# Patient Record
Sex: MALE | Race: WHITE | Employment: OTHER | URBAN - METROPOLITAN AREA
[De-identification: names, ages, dates, MRNs, and addresses within clinical notes are randomized per-mention and may not be internally consistent; named-entity substitution may affect disease eponyms.]

---

## 2021-12-12 ENCOUNTER — HOSPITAL ENCOUNTER (INPATIENT)
Age: 86
LOS: 2 days | Discharge: ANOTHER HOSPITAL | DRG: 280 | End: 2021-12-14
Attending: EMERGENCY MEDICINE | Admitting: FAMILY MEDICINE
Payer: MEDICARE

## 2021-12-12 ENCOUNTER — APPOINTMENT (OUTPATIENT)
Dept: GENERAL RADIOLOGY | Age: 86
DRG: 280 | End: 2021-12-12
Attending: EMERGENCY MEDICINE
Payer: MEDICARE

## 2021-12-12 DIAGNOSIS — I21.4 NSTEMI (NON-ST ELEVATED MYOCARDIAL INFARCTION) (HCC): Primary | ICD-10-CM

## 2021-12-12 DIAGNOSIS — I50.9 CONGESTIVE HEART FAILURE, UNSPECIFIED HF CHRONICITY, UNSPECIFIED HEART FAILURE TYPE (HCC): ICD-10-CM

## 2021-12-12 LAB
ALBUMIN SERPL-MCNC: 3.3 G/DL (ref 3.5–5)
ALBUMIN/GLOB SERPL: 1 {RATIO} (ref 1.1–2.2)
ALP SERPL-CCNC: 272 U/L (ref 45–117)
ALT SERPL-CCNC: 69 U/L (ref 12–78)
ANION GAP SERPL CALC-SCNC: 6 MMOL/L (ref 5–15)
APTT PPP: 36.9 SEC (ref 21.2–34.1)
AST SERPL W P-5'-P-CCNC: 128 U/L (ref 15–37)
BASOPHILS # BLD: 0.1 K/UL (ref 0–0.1)
BASOPHILS NFR BLD: 1 % (ref 0–1)
BILIRUB SERPL-MCNC: 0.8 MG/DL (ref 0.2–1)
BNP SERPL-MCNC: ABNORMAL PG/ML
BNP SERPL-MCNC: ABNORMAL PG/ML
BUN SERPL-MCNC: 21 MG/DL (ref 6–20)
BUN/CREAT SERPL: 19 (ref 12–20)
CA-I BLD-MCNC: 8.9 MG/DL (ref 8.5–10.1)
CHLORIDE SERPL-SCNC: 105 MMOL/L (ref 97–108)
CO2 SERPL-SCNC: 28 MMOL/L (ref 21–32)
CREAT SERPL-MCNC: 1.09 MG/DL (ref 0.7–1.3)
DATE LAST DOSE: 0
DIFFERENTIAL METHOD BLD: ABNORMAL
EOSINOPHIL # BLD: 0.1 K/UL (ref 0–0.4)
EOSINOPHIL NFR BLD: 1 % (ref 0–7)
ERYTHROCYTE [DISTWIDTH] IN BLOOD BY AUTOMATED COUNT: 14.5 % (ref 11.5–14.5)
GLOBULIN SER CALC-MCNC: 3.2 G/DL (ref 2–4)
GLUCOSE SERPL-MCNC: 119 MG/DL (ref 65–100)
HCT VFR BLD AUTO: 45.8 % (ref 36.6–50.3)
HGB BLD-MCNC: 15 G/DL (ref 12.1–17)
IMM GRANULOCYTES # BLD AUTO: 0 K/UL (ref 0–0.04)
IMM GRANULOCYTES NFR BLD AUTO: 0 % (ref 0–0.5)
LYMPHOCYTES # BLD: 0.9 K/UL (ref 0.8–3.5)
LYMPHOCYTES NFR BLD: 10 % (ref 12–49)
MCH RBC QN AUTO: 32.5 PG (ref 26–34)
MCHC RBC AUTO-ENTMCNC: 32.8 G/DL (ref 30–36.5)
MCV RBC AUTO: 99.1 FL (ref 80–99)
MONOCYTES # BLD: 1 K/UL (ref 0–1)
MONOCYTES NFR BLD: 11 % (ref 5–13)
NEUTS SEG # BLD: 7.3 K/UL (ref 1.8–8)
NEUTS SEG NFR BLD: 77 % (ref 32–75)
NRBC # BLD: 0 K/UL (ref 0–0.01)
NRBC BLD-RTO: 0 PER 100 WBC
PHENYTOIN SERPL-MCNC: 13.1 UG/ML (ref 10–20)
PLATELET # BLD AUTO: 224 K/UL (ref 150–400)
PMV BLD AUTO: 9.8 FL (ref 8.9–12.9)
POTASSIUM SERPL-SCNC: 4.2 MMOL/L (ref 3.5–5.1)
PROT SERPL-MCNC: 6.5 G/DL (ref 6.4–8.2)
RBC # BLD AUTO: 4.62 M/UL (ref 4.1–5.7)
REPORTED DOSE,DOSE: 0 UNITS
SODIUM SERPL-SCNC: 139 MMOL/L (ref 136–145)
THERAPEUTIC RANGE,PTTT: ABNORMAL SEC (ref 82–109)
TROPONIN-HIGH SENSITIVITY: 8731 NG/L (ref 0–76)
TROPONIN-HIGH SENSITIVITY: 8908 NG/L (ref 0–76)
WBC # BLD AUTO: 9.4 K/UL (ref 4.1–11.1)

## 2021-12-12 PROCEDURE — 83880 ASSAY OF NATRIURETIC PEPTIDE: CPT

## 2021-12-12 PROCEDURE — 74011250636 HC RX REV CODE- 250/636: Performed by: FAMILY MEDICINE

## 2021-12-12 PROCEDURE — 84484 ASSAY OF TROPONIN QUANT: CPT

## 2021-12-12 PROCEDURE — 36415 COLL VENOUS BLD VENIPUNCTURE: CPT

## 2021-12-12 PROCEDURE — 65270000029 HC RM PRIVATE

## 2021-12-12 PROCEDURE — 80184 ASSAY OF PHENOBARBITAL: CPT

## 2021-12-12 PROCEDURE — 80185 ASSAY OF PHENYTOIN TOTAL: CPT

## 2021-12-12 PROCEDURE — 85730 THROMBOPLASTIN TIME PARTIAL: CPT

## 2021-12-12 PROCEDURE — 71045 X-RAY EXAM CHEST 1 VIEW: CPT

## 2021-12-12 PROCEDURE — 85025 COMPLETE CBC W/AUTO DIFF WBC: CPT

## 2021-12-12 PROCEDURE — 93005 ELECTROCARDIOGRAM TRACING: CPT

## 2021-12-12 PROCEDURE — 74011250637 HC RX REV CODE- 250/637: Performed by: STUDENT IN AN ORGANIZED HEALTH CARE EDUCATION/TRAINING PROGRAM

## 2021-12-12 PROCEDURE — 80053 COMPREHEN METABOLIC PANEL: CPT

## 2021-12-12 PROCEDURE — 87040 BLOOD CULTURE FOR BACTERIA: CPT

## 2021-12-12 PROCEDURE — 74011250637 HC RX REV CODE- 250/637: Performed by: FAMILY MEDICINE

## 2021-12-12 PROCEDURE — 99285 EMERGENCY DEPT VISIT HI MDM: CPT

## 2021-12-12 RX ORDER — FUROSEMIDE 10 MG/ML
40 INJECTION INTRAMUSCULAR; INTRAVENOUS 2 TIMES DAILY
Status: DISCONTINUED | OUTPATIENT
Start: 2021-12-12 | End: 2021-12-14 | Stop reason: HOSPADM

## 2021-12-12 RX ORDER — ASPIRIN 325 MG
325 TABLET ORAL
Status: COMPLETED | OUTPATIENT
Start: 2021-12-12 | End: 2021-12-12

## 2021-12-12 RX ORDER — PHENOBARBITAL 32.4 MG/1
97.2 TABLET ORAL ONCE
Status: COMPLETED | OUTPATIENT
Start: 2021-12-12 | End: 2021-12-12

## 2021-12-12 RX ORDER — HEPARIN SODIUM 1000 [USP'U]/ML
4000 INJECTION, SOLUTION INTRAVENOUS; SUBCUTANEOUS ONCE
Status: COMPLETED | OUTPATIENT
Start: 2021-12-12 | End: 2021-12-12

## 2021-12-12 RX ORDER — ASPIRIN 325 MG
325 TABLET ORAL DAILY
Status: DISCONTINUED | OUTPATIENT
Start: 2021-12-13 | End: 2021-12-14 | Stop reason: HOSPADM

## 2021-12-12 RX ORDER — HEPARIN SODIUM 1000 [USP'U]/ML
4000 INJECTION, SOLUTION INTRAVENOUS; SUBCUTANEOUS AS NEEDED
Status: DISCONTINUED | OUTPATIENT
Start: 2021-12-12 | End: 2021-12-14 | Stop reason: HOSPADM

## 2021-12-12 RX ORDER — ACETAMINOPHEN 325 MG/1
650 TABLET ORAL
Status: DISCONTINUED | OUTPATIENT
Start: 2021-12-12 | End: 2021-12-14 | Stop reason: HOSPADM

## 2021-12-12 RX ORDER — ALLOPURINOL 300 MG/1
300 TABLET ORAL
Status: DISCONTINUED | OUTPATIENT
Start: 2021-12-12 | End: 2021-12-14 | Stop reason: HOSPADM

## 2021-12-12 RX ORDER — PHENYTOIN SODIUM 100 MG/1
100 CAPSULE, EXTENDED RELEASE ORAL
Status: COMPLETED | OUTPATIENT
Start: 2021-12-12 | End: 2021-12-12

## 2021-12-12 RX ORDER — PHENOBARBITAL 32.4 MG/1
97.2 TABLET ORAL 2 TIMES DAILY
Status: DISCONTINUED | OUTPATIENT
Start: 2021-12-12 | End: 2021-12-14 | Stop reason: HOSPADM

## 2021-12-12 RX ORDER — POLYETHYLENE GLYCOL 3350 17 G/17G
17 POWDER, FOR SOLUTION ORAL DAILY PRN
Status: DISCONTINUED | OUTPATIENT
Start: 2021-12-12 | End: 2021-12-14 | Stop reason: HOSPADM

## 2021-12-12 RX ORDER — CLOPIDOGREL 300 MG/1
600 TABLET, FILM COATED ORAL DAILY
Status: DISCONTINUED | OUTPATIENT
Start: 2021-12-12 | End: 2021-12-12

## 2021-12-12 RX ORDER — PHENYTOIN SODIUM 100 MG/1
100 CAPSULE, EXTENDED RELEASE ORAL 2 TIMES DAILY
Status: DISCONTINUED | OUTPATIENT
Start: 2021-12-12 | End: 2021-12-14 | Stop reason: HOSPADM

## 2021-12-12 RX ORDER — CLOPIDOGREL 300 MG/1
300 TABLET, FILM COATED ORAL DAILY
Status: DISCONTINUED | OUTPATIENT
Start: 2021-12-13 | End: 2021-12-12

## 2021-12-12 RX ORDER — HEPARIN SODIUM 1000 [USP'U]/ML
2000 INJECTION, SOLUTION INTRAVENOUS; SUBCUTANEOUS AS NEEDED
Status: DISCONTINUED | OUTPATIENT
Start: 2021-12-12 | End: 2021-12-14 | Stop reason: HOSPADM

## 2021-12-12 RX ORDER — METOPROLOL SUCCINATE 25 MG/1
25 TABLET, EXTENDED RELEASE ORAL DAILY
Status: DISCONTINUED | OUTPATIENT
Start: 2021-12-13 | End: 2021-12-12

## 2021-12-12 RX ORDER — CLOPIDOGREL BISULFATE 75 MG/1
75 TABLET ORAL DAILY
Status: DISCONTINUED | OUTPATIENT
Start: 2021-12-13 | End: 2021-12-13

## 2021-12-12 RX ORDER — ATORVASTATIN CALCIUM 40 MG/1
80 TABLET, FILM COATED ORAL
Status: DISCONTINUED | OUTPATIENT
Start: 2021-12-12 | End: 2021-12-14 | Stop reason: HOSPADM

## 2021-12-12 RX ORDER — HEPARIN SODIUM 10000 [USP'U]/100ML
12-25 INJECTION, SOLUTION INTRAVENOUS
Status: DISCONTINUED | OUTPATIENT
Start: 2021-12-12 | End: 2021-12-14 | Stop reason: HOSPADM

## 2021-12-12 RX ORDER — METOPROLOL SUCCINATE 25 MG/1
50 TABLET, EXTENDED RELEASE ORAL
Status: DISCONTINUED | OUTPATIENT
Start: 2021-12-12 | End: 2021-12-14 | Stop reason: HOSPADM

## 2021-12-12 RX ORDER — CLOPIDOGREL 300 MG/1
600 TABLET, FILM COATED ORAL ONCE
Status: COMPLETED | OUTPATIENT
Start: 2021-12-12 | End: 2021-12-12

## 2021-12-12 RX ORDER — ONDANSETRON 4 MG/1
4 TABLET, ORALLY DISINTEGRATING ORAL
Status: DISCONTINUED | OUTPATIENT
Start: 2021-12-12 | End: 2021-12-14 | Stop reason: HOSPADM

## 2021-12-12 RX ORDER — ACETAMINOPHEN 650 MG/1
650 SUPPOSITORY RECTAL
Status: DISCONTINUED | OUTPATIENT
Start: 2021-12-12 | End: 2021-12-14 | Stop reason: HOSPADM

## 2021-12-12 RX ORDER — ONDANSETRON 2 MG/ML
4 INJECTION INTRAMUSCULAR; INTRAVENOUS
Status: DISCONTINUED | OUTPATIENT
Start: 2021-12-12 | End: 2021-12-14 | Stop reason: HOSPADM

## 2021-12-12 RX ORDER — CLOPIDOGREL 300 MG/1
300 TABLET, FILM COATED ORAL ONCE
Status: COMPLETED | OUTPATIENT
Start: 2021-12-12 | End: 2021-12-12

## 2021-12-12 RX ORDER — FUROSEMIDE 10 MG/ML
40 INJECTION INTRAMUSCULAR; INTRAVENOUS
Status: COMPLETED | OUTPATIENT
Start: 2021-12-12 | End: 2021-12-12

## 2021-12-12 RX ADMIN — ACETAMINOPHEN 650 MG: 325 TABLET ORAL at 20:11

## 2021-12-12 RX ADMIN — PHENYTOIN SODIUM 100 MG: 100 CAPSULE ORAL at 21:08

## 2021-12-12 RX ADMIN — CLOPIDOGREL BISULFATE 300 MG: 300 TABLET, FILM COATED ORAL at 12:56

## 2021-12-12 RX ADMIN — HEPARIN SODIUM 2000 UNITS: 1000 INJECTION, SOLUTION INTRAVENOUS; SUBCUTANEOUS at 23:21

## 2021-12-12 RX ADMIN — HEPARIN SODIUM 12 UNITS/KG/HR: 10000 INJECTION, SOLUTION INTRAVENOUS at 14:51

## 2021-12-12 RX ADMIN — CLOPIDOGREL BISULFATE 600 MG: 300 TABLET, FILM COATED ORAL at 21:07

## 2021-12-12 RX ADMIN — ASPIRIN 325 MG ORAL TABLET 325 MG: 325 PILL ORAL at 12:56

## 2021-12-12 RX ADMIN — PHENOBARBITAL 97.2 MG: 32.4 TABLET ORAL at 14:06

## 2021-12-12 RX ADMIN — PHENOBARBITAL 97.2 MG: 32.4 TABLET ORAL at 21:08

## 2021-12-12 RX ADMIN — METOPROLOL SUCCINATE 50 MG: 25 TABLET, EXTENDED RELEASE ORAL at 21:08

## 2021-12-12 RX ADMIN — FUROSEMIDE 40 MG: 10 INJECTION, SOLUTION INTRAMUSCULAR; INTRAVENOUS at 20:10

## 2021-12-12 RX ADMIN — PHENYTOIN SODIUM 100 MG: 100 CAPSULE ORAL at 14:01

## 2021-12-12 RX ADMIN — FUROSEMIDE 40 MG: 10 INJECTION, SOLUTION INTRAMUSCULAR; INTRAVENOUS at 12:56

## 2021-12-12 RX ADMIN — ALLOPURINOL 300 MG: 300 TABLET ORAL at 21:08

## 2021-12-12 RX ADMIN — HEPARIN SODIUM 4000 UNITS: 1000 INJECTION, SOLUTION INTRAVENOUS; SUBCUTANEOUS at 14:51

## 2021-12-12 RX ADMIN — ATORVASTATIN CALCIUM 80 MG: 40 TABLET, FILM COATED ORAL at 20:10

## 2021-12-12 NOTE — H&P
History and Physical    NAME: Darío Mendoza   :  1933   MRN:  [id number]     Date/Time:  2021 12:41 PM    Patient PCP: Viki Johnson MD  ______________________________________________________________________             Subjective:     CHIEF COMPLAINT:     Chest pain and shortness of breath    HISTORY OF PRESENT ILLNESS:       Patient is a 80y.o. year old male signal past medical history of CAD s/p CABG in  hypertension gout seizure disorder came to emergency room complaining of chest pain shortness of breath for last 5 days denies any fever no chills    Seen by the ER physician work-up done in the ER shows elevated BNP elevated troponin    Patient was admitted with acute non-STEMI and CHF    Past Medical History:   Diagnosis Date    CAD (coronary artery disease)     GERD (gastroesophageal reflux disease)     Hypertension     Other ill-defined conditions     gout    Seizures ([facility] [address]         Past Surgical History:   Procedure Laterality Date    CABG, ARTERY-VEIN, FOUR      HX CATARACT REMOVAL      bilateral    HX HERNIA REPAIR      HX ORTHOPAEDIC      bilateral knee scopes    HX OTHER SURGICAL      head injury caused seizures       Social History     Tobacco Use    Smoking status: Former Smoker     Quit date: 1982     Years since quittin.9    Smokeless tobacco: Not on file   Substance Use Topics    Alcohol use: No        No family history on file. Allergies   Allergen Reactions    Codeine Swelling        Prior to Admission medications    Medication Sig Start Date End Date Taking? Authorizing Provider   aspirin (ASPIRIN) 325 mg tablet Take 325 mg by mouth daily. Provider, Historical   PHENOBARBITAL, BULK, Take 1 Tab by mouth three (3) times daily. Provider, Historical   METOPROLOL SUCCINATE PO Take 1 Tab by mouth daily. Provider, Historical   OMEPRAZOLE PO Take 1 Tab by mouth daily.       Provider, Historical   ALLOPURINOL PO Take 1 Tab by mouth nightly. Provider, Historical   rosuvastatin (CRESTOR) 20 mg tablet Take 40 mg by mouth nightly. Provider, Historical   multivitamins-minerals-lutein (CENTRUM SILVER) Tab Take 1 Tab by mouth daily. Provider, Historical   DOCOSAHEXANOIC ACID/EPA (FISH OIL PO) Take 2 Tabs by mouth daily. Provider, Historical   psyllium (METAMUCIL SMOOTH TEXTURE) packet Take 1 Packet by mouth two (2) times a day. Provider, Historical   misoprostol (CYTOTEC) 100 mcg tablet Take 2 Tabs by mouth two (2) times a day. 8/6/12   Shauna Turner MD         Current Facility-Administered Medications:     aspirin tablet 325 mg, 325 mg, Oral, NOW, Jermaine Cruz MD    heparin (porcine) 1,000 unit/mL injection 4,000 Units, 4,000 Units, IntraVENous, ONCE, Jermaine Cruz MD    heparin (porcine) 25,000 units in 0.45% saline 250 ml infusion, 12-25 Units/kg/hr, IntraVENous, TITRATE, Jermaine Cruz MD    clopidogreL (PLAVIX) tablet 300 mg, 300 mg, Oral, ONCE, Kali Cruz MD    heparin (porcine) 1,000 unit/mL injection 4,000 Units, 4,000 Units, IntraVENous, PRN **OR** heparin (porcine) 1,000 unit/mL injection 2,000 Units, 2,000 Units, IntraVENous, PRN, Kali Cruz MD    furosemide (LASIX) injection 40 mg, 40 mg, IntraVENous, NOW, Kali Cruz MD    PHENobarbitaL (LUMINAL) tablet 97.2 mg, 97.2 mg, Oral, ONCE, Kali Cruz MD    phenytoin ER (DILANTIN ER) ER capsule 100 mg, 100 mg, Oral, NOW, MD Maliha Barton  . PHARMACY TO SUBSTITUTE PER PROTOCOL (Reordered from: ALLOPURINOL PO), , , Per Protocol, MD Maliha Barton  [START ON 12/13/2021] aspirin tablet 325 mg, 325 mg, Oral, DAILY, Jermaine Cruz MD Audelia Huxley  [START ON 12/13/2021] metoprolol succinate (TOPROL-XL) XL tablet 25 mg, 25 mg, Oral, DAILY, Kali Cruz MD    rosuvastatin (CRESTOR) tablet 40 mg, 40 mg, Oral, QHS, Kali Cruz MD    acetaminophen (TYLENOL) tablet 650 mg, 650 mg, Oral, Q6H PRN **OR** acetaminophen (TYLENOL) suppository 650 mg, 650 mg, Rectal, Q6H PRN, Kermit Cruz MD    polyethylene glycol (MIRALAX) packet 17 g, 17 g, Oral, DAILY PRN, Kermit Cruz MD    ondansetron (ZOFRAN ODT) tablet 4 mg, 4 mg, Oral, Q8H PRN **OR** ondansetron (ZOFRAN) injection 4 mg, 4 mg, IntraVENous, Q6H PRN, Kali Cruz MD    furosemide (LASIX) injection 40 mg, 40 mg, IntraVENous, BID, Kali Cruz MD    Current Outpatient Medications:     aspirin (ASPIRIN) 325 mg tablet, Take 325 mg by mouth daily. , Disp: , Rfl:     PHENOBARBITAL, BULK,, Take 1 Tab by mouth three (3) times daily. , Disp: , Rfl:     METOPROLOL SUCCINATE PO, Take 1 Tab by mouth daily. , Disp: , Rfl:     OMEPRAZOLE PO, Take 1 Tab by mouth daily. , Disp: , Rfl:     ALLOPURINOL PO, Take 1 Tab by mouth nightly.  , Disp: , Rfl:     rosuvastatin (CRESTOR) 20 mg tablet, Take 40 mg by mouth nightly.  , Disp: , Rfl:     multivitamins-minerals-lutein (CENTRUM SILVER) Tab, Take 1 Tab by mouth daily. , Disp: , Rfl:     DOCOSAHEXANOIC ACID/EPA (FISH OIL PO), Take 2 Tabs by mouth daily.   , Disp: , Rfl:     psyllium (METAMUCIL SMOOTH TEXTURE) packet, Take 1 Packet by mouth two (2) times a day.  , Disp: , Rfl:     misoprostol (CYTOTEC) 100 mcg tablet, Take 2 Tabs by mouth two (2) times a day., Disp: 180 Tab, Rfl: 3    LAB DATA REVIEWED:    Recent Results (from the past 24 hour(s))   CBC WITH AUTOMATED DIFF    Collection Time: 12/12/21  9:35 AM   Result Value Ref Range    WBC 9.4 4.1 - 11.1 K/uL    RBC 4.62 4.10 - 5.70 M/uL    HGB 15.0 12.1 - 17.0 g/dL    HCT 45.8 36.6 - 50.3 %    MCV 99.1 (H) 80.0 - 99.0 FL    MCH 32.5 26.0 - 34.0 PG    MCHC 32.8 30.0 - 36.5 g/dL    RDW 14.5 11.5 - 14.5 %    PLATELET 767 692 - 197 K/uL    MPV 9.8 8.9 - 12.9 FL    NRBC 0.0 0.0  WBC    ABSOLUTE NRBC 0.00 0.00 - 0.01 K/uL    NEUTROPHILS 77 (H) 32 - 75 %    LYMPHOCYTES 10 (L) 12 - 49 %    MONOCYTES 11 5 - 13 %    EOSINOPHILS 1 0 - 7 %    BASOPHILS 1 0 - 1 % IMMATURE GRANULOCYTES 0 0 - 0.5 %    ABS. NEUTROPHILS 7.3 1.8 - 8.0 K/UL    ABS. LYMPHOCYTES 0.9 0.8 - 3.5 K/UL    ABS. MONOCYTES 1.0 0.0 - 1.0 K/UL    ABS. EOSINOPHILS 0.1 0.0 - 0.4 K/UL    ABS. BASOPHILS 0.1 0.0 - 0.1 K/UL    ABS. IMM. GRANS. 0.0 0.00 - 0.04 K/UL    DF AUTOMATED     METABOLIC PANEL, COMPREHENSIVE    Collection Time: 12/12/21  9:35 AM   Result Value Ref Range    Sodium 139 136 - 145 mmol/L    Potassium 4.2 3.5 - 5.1 mmol/L    Chloride 105 97 - 108 mmol/L    CO2 28 21 - 32 mmol/L    Anion gap 6 5 - 15 mmol/L    Glucose 119 (H) 65 - 100 mg/dL    BUN 21 (H) 6 - 20 mg/dL    Creatinine 1.09 0.70 - 1.30 mg/dL    BUN/Creatinine ratio 19 12 - 20      GFR est AA >60 >60 ml/min/1.73m2    GFR est non-AA >60 >60 ml/min/1.73m2    Calcium 8.9 8.5 - 10.1 mg/dL    Bilirubin, total 0.8 0.2 - 1.0 mg/dL    AST (SGOT) 128 (H) 15 - 37 U/L    ALT (SGPT) 69 12 - 78 U/L    Alk. phosphatase 272 (H) 45 - 117 U/L    Protein, total 6.5 6.4 - 8.2 g/dL    Albumin 3.3 (L) 3.5 - 5.0 g/dL    Globulin 3.2 2.0 - 4.0 g/dL    A-G Ratio 1.0 (L) 1.1 - 2.2     TROPONIN-HIGH SENSITIVITY    Collection Time: 12/12/21  9:35 AM   Result Value Ref Range    Troponin-High Sensitivity 8,908 (HH) 0 - 76 ng/L   NT-PRO BNP    Collection Time: 12/12/21 10:26 AM   Result Value Ref Range    NT pro-BNP 10,395 (H) <450 pg/mL       XR Results (most recent):  Results from [place] encounter on 12/12/21    XR CHEST PORT    Narrative  Chest single view. Patchy alveolar opacities mid and lower lung predominance. Findings concerning  for infectious/inflammatory process including viral etiologies. Sternal wires in  the midline related for intrathoracic surgery. Cardiac silhouette enlargement. Atherosclerotic change thoracic aorta. No pneumothorax or sizable pleural  effusion. XR CHEST PORT   Final Result           Review of Systems:  Constitutional: Negative for chills and fever. HENT: Negative. Eyes: Negative.     Respiratory: Shortness of breath   cardiovascular: Chest pain  Gastrointestinal: Negative for abdominal pain and nausea. Skin: Negative. Neurological: Negative. Objective:   VITALS:    Visit Vitals  BP (!) 143/93   Pulse 85   Temp 98.5 °F (36.9 °C)   Resp 18   Ht 5' 8\" (1.727 m)   Wt 76.2 kg (168 lb)   SpO2 100%   BMI 25.54 kg/m²       Physical Exam:   Constitutional: pt is oriented to person, place, and time. HENT:   Head: Normocephalic and atraumatic. Eyes: Pupils are equal, round, and reactive to light. EOM are normal.   Cardiovascular: Normal rate, regular rhythm and normal heart sounds. Pulmonary/Chest: Breath sounds normal. No wheezes. No rales. Exhibits no tenderness. Abdominal: Soft. Bowel sounds are normal. There is no abdominal tenderness. There is no rebound and no guarding. Musculoskeletal: Normal range of motion. Neurological: pt is alert and oriented to person, place, and time. Alert. Normal strength. No cranial nerve deficit or sensory deficit. Displays a negative Romberg sign.         ASSESSMENT & PLAN:    Non-STEMI  Acute congestive heart failure  CAD status post CABG  Hypertension  Seizure disorder        Current Facility-Administered Medications:     aspirin tablet 325 mg, 325 mg, Oral, NOW, Samuel Cruz MD    heparin (porcine) 1,000 unit/mL injection 4,000 Units, 4,000 Units, IntraVENous, ONCE, Kali Cruz MD    heparin (porcine) 25,000 units in 0.45% saline 250 ml infusion, 12-25 Units/kg/hr, IntraVENous, TITRATE, Samuel Cruz MD    clopidogreL (PLAVIX) tablet 300 mg, 300 mg, Oral, ONCE, Kali Cruz MD    heparin (porcine) 1,000 unit/mL injection 4,000 Units, 4,000 Units, IntraVENous, PRN **OR** heparin (porcine) 1,000 unit/mL injection 2,000 Units, 2,000 Units, IntraVENous, PRN, Kali Cruz MD    furosemide (LASIX) injection 40 mg, 40 mg, IntraVENous, NOW, Kali Cruz MD    PHENobarbitaL (LUMINAL) tablet 97.2 mg, 97.2 mg, Oral, ONCE, Anthony Mary Jo Drake MD    phenytoin ER (DILANTIN ER) ER capsule 100 mg, 100 mg, Oral, NOW, MD Berta Alves  . PHARMACY TO SUBSTITUTE PER PROTOCOL (Reordered from: ALLOPURINOL PO), , , Per Protocol, MD Berta Alves  [START ON 12/13/2021] aspirin tablet 325 mg, 325 mg, Oral, DAILY, Mary Jo Cruz MD Goldie Solum  [START ON 12/13/2021] metoprolol succinate (TOPROL-XL) XL tablet 25 mg, 25 mg, Oral, DAILY, Mary Jo Cruz MD    rosuvastatin (CRESTOR) tablet 40 mg, 40 mg, Oral, QHS, Kali Cruz MD    acetaminophen (TYLENOL) tablet 650 mg, 650 mg, Oral, Q6H PRN **OR** acetaminophen (TYLENOL) suppository 650 mg, 650 mg, Rectal, Q6H PRN, Mary Jo Cruz MD    polyethylene glycol (MIRALAX) packet 17 g, 17 g, Oral, DAILY PRN, Mary Jo Cruz MD    ondansetron (ZOFRAN ODT) tablet 4 mg, 4 mg, Oral, Q8H PRN **OR** ondansetron (ZOFRAN) injection 4 mg, 4 mg, IntraVENous, Q6H PRN, Kali Cruz MD    furosemide (LASIX) injection 40 mg, 40 mg, IntraVENous, BID, Kali Cruz MD    Current Outpatient Medications:     aspirin (ASPIRIN) 325 mg tablet, Take 325 mg by mouth daily. , Disp: , Rfl:     PHENOBARBITAL, BULK,, Take 1 Tab by mouth three (3) times daily. , Disp: , Rfl:     METOPROLOL SUCCINATE PO, Take 1 Tab by mouth daily. , Disp: , Rfl:     OMEPRAZOLE PO, Take 1 Tab by mouth daily. , Disp: , Rfl:     ALLOPURINOL PO, Take 1 Tab by mouth nightly.  , Disp: , Rfl:     rosuvastatin (CRESTOR) 20 mg tablet, Take 40 mg by mouth nightly.  , Disp: , Rfl:     multivitamins-minerals-lutein (CENTRUM SILVER) Tab, Take 1 Tab by mouth daily. , Disp: , Rfl:     DOCOSAHEXANOIC ACID/EPA (FISH OIL PO), Take 2 Tabs by mouth daily.   , Disp: , Rfl:     psyllium (METAMUCIL SMOOTH TEXTURE) packet, Take 1 Packet by mouth two (2) times a day.  , Disp: , Rfl:     misoprostol (CYTOTEC) 100 mcg tablet, Take 2 Tabs by mouth two (2) times a day., Disp: 180 Tab, Rfl: 3     Admit to telemetry for start on IV Lasix heparin drip continue aspirin statin repeat BNP and troponin 2D echo    Cardiology consulted  ER    ER physician tried to transfer the patient to [facility] as patient requests  [facility] divergent  ________________________________________________________________________    Signed: Samir Armijo MD

## 2021-12-12 NOTE — ROUTINE PROCESS
TRANSFER - OUT REPORT:    Verbal report given to 4w (name) on Paul Guevara  being transferred to -477/01(unit) for routine progression of care       Report consisted of patients Situation, Background, Assessment and   Recommendations(SBAR). Information from the following report(s) SBAR, ED Summary, Intake/Output, MAR and Recent Results was reviewed with the receiving nurse. Lines:   Peripheral IV 12/12/21 Anterior; Left; Proximal Forearm (Active)        Opportunity for questions and clarification was provided.       Patient transported with:   Monitor  Tech

## 2021-12-12 NOTE — ED PROVIDER NOTES
EMERGENCY DEPARTMENT HISTORY AND PHYSICAL EXAM      Date: 12/12/2021  Patient Name: Concepcion Lion    History of Presenting Illness     Chief Complaint   Patient presents with    Fatigue    Shortness of Breath       History Provided By: Patient and EMS    HPI: Concepcion Lion, 80 y.o. male with a past medical history significant diabetes, hypertension, hyperlipidemia, obesity and myocardial infarction presents to the ED with chief complaint of Fatigue and Shortness of Breath  . 59-year-old male follows with cardiology [address]. Patient had intense chest pain last night slight in today. Noticed some shortness of breath especially when walking to the mailbox. Progressive over 5 days. Feeling ill. The fatigue is still present the shortness of breath is still present. The chest pain is very mild to minimal.          There are no other complaints, changes, or physical findings at this time. PCP: Sandra Delgado MD    Current Facility-Administered Medications   Medication Dose Route Frequency Provider Last Rate Last Admin    heparin (porcine) 1,000 unit/mL injection 4,000 Units  4,000 Units IntraVENous ONCE Hillary Cruz MD        heparin (porcine) 25,000 units in 0.45% saline 250 ml infusion  12-25 Units/kg/hr IntraVENous TITRATE Hillary Cruz MD        heparin (porcine) 1,000 unit/mL injection 4,000 Units  4,000 Units IntraVENous PRN Hillary Cruz MD        Or    heparin (porcine) 1,000 unit/mL injection 2,000 Units  2,000 Units IntraVENous PRN Hillary Cruz MD        PHENobarbitaL (LUMINAL) tablet 97.2 mg  97.2 mg Oral ONCE Kali Cruz MD        phenytoin ER (DILANTIN ER) ER capsule 100 mg  100 mg Oral NOW Derril Bernhardt, MD [facility] . PHARMACY TO SUBSTITUTE PER PROTOCOL (Reordered from: Jenniffer Gray)    Per Protocol Derril Bernhardt, MD [facility] [START ON 12/13/2021] aspirin tablet 325 mg  325 mg Oral DAILY Hillary Cruz MD        [START ON 12/13/2021] metoprolol succinate (TOPROL-XL) XL tablet 25 mg  25 mg Oral DAILY Yarely Cruz MD        rosuvastatin (CRESTOR) tablet 40 mg  40 mg Oral QHS Yarely Cruz MD        acetaminophen (TYLENOL) tablet 650 mg  650 mg Oral Q6H PRN Yarely Cruz MD        Or   Dossie Maricruz acetaminophen (TYLENOL) suppository 650 mg  650 mg Rectal Q6H PRN Yarely Cruz MD        polyethylene glycol (MIRALAX) packet 17 g  17 g Oral DAILY PRN Yarely Cruz MD        ondansetron (ZOFRAN ODT) tablet 4 mg  4 mg Oral Q8H PRN Yarely Cruz MD        Or    [facility] 4 mg  4 mg IntraVENous Q6H PRN Yarely Cruz MD        furosemide (LASIX) injection 40 mg  40 mg IntraVENous BID Kali Cruz MD         Current Outpatient Medications   Medication Sig Dispense Refill    aspirin (ASPIRIN) 325 mg tablet Take 325 mg by mouth daily.  PHENOBARBITAL, BULK, Take 1 Tab by mouth three (3) times daily.  METOPROLOL SUCCINATE PO Take 1 Tab by mouth daily.  OMEPRAZOLE PO Take 1 Tab by mouth daily.  ALLOPURINOL PO Take 1 Tab by mouth nightly.  rosuvastatin (CRESTOR) 20 mg tablet Take 40 mg by mouth nightly.  multivitamins-minerals-lutein (CENTRUM SILVER) Tab Take 1 Tab by mouth daily.  DOCOSAHEXANOIC ACID/EPA (FISH OIL PO) Take 2 Tabs by mouth daily.  psyllium (METAMUCIL SMOOTH TEXTURE) packet Take 1 Packet by mouth two (2) times a day.  misoprostol (CYTOTEC) 100 mcg tablet Take 2 Tabs by mouth two (2) times a day.  180 Tab 3       Past History     Past Medical History:  Past Medical History:   Diagnosis Date    CAD (coronary artery disease)     GERD (gastroesophageal reflux disease)     Hypertension     Other ill-defined conditions     gout    Seizures ([facility] [address] 1984       Past Surgical History:  Past Surgical History:   Procedure Laterality Date    CABG, ARTERY-VEIN, FOUR      HX CATARACT REMOVAL      bilateral    HX HERNIA REPAIR      HX ORTHOPAEDIC bilateral knee scopes    HX OTHER SURGICAL      head injury caused seizures       Family History:  No family history on file. Social History:  Social History     Tobacco Use    Smoking status: Former Smoker     Quit date: 1982     Years since quittin.9    Smokeless tobacco: Not on file   Substance Use Topics    Alcohol use: No    Drug use: No       Allergies: Allergies   Allergen Reactions    Codeine Swelling         Review of Systems   Review of Systems   Constitutional: Positive for fatigue. Negative for chills and fever. HENT: Negative. Negative for congestion, ear pain, nosebleeds and sore throat. Eyes: Negative. Negative for pain, discharge and visual disturbance. Respiratory: Positive for chest tightness and shortness of breath. Negative for cough. Cardiovascular: Negative. Negative for chest pain and leg swelling. Gastrointestinal: Negative. Negative for abdominal pain, blood in stool, constipation, diarrhea, nausea and vomiting. Endocrine: Negative. Genitourinary: Negative. Negative for difficulty urinating, dysuria and flank pain. Musculoskeletal: Negative. Negative for back pain and myalgias. Skin: Negative. Negative for rash and wound. Allergic/Immunologic: Negative. Neurological: Negative. Negative for dizziness, syncope, weakness, numbness and headaches. Hematological: Negative. Does not bruise/bleed easily. Psychiatric/Behavioral: Negative. Negative for agitation, confusion, hallucinations and suicidal ideas. All other systems reviewed and are negative. Physical Exam   Physical Exam  Vitals and nursing note reviewed. Constitutional:       General: He is not in acute distress. Appearance: He is normal weight. He is not ill-appearing. HENT:      Head: Normocephalic and atraumatic. Right Ear: External ear normal.      Left Ear: External ear normal.      Nose: Nose normal. No rhinorrhea.       Mouth/Throat:      Mouth: Mucous membranes are moist.      Pharynx: Oropharynx is clear. Eyes:      Extraocular Movements: Extraocular movements intact. Conjunctiva/sclera: Conjunctivae normal.      Pupils: Pupils are equal, round, and reactive to light. Cardiovascular:      Rate and Rhythm: Normal rate and regular rhythm. Pulses: Normal pulses. Heart sounds: Normal heart sounds. Pulmonary:      Effort: Pulmonary effort is normal. No respiratory distress. Breath sounds: Normal breath sounds. Abdominal:      General: Abdomen is flat. Bowel sounds are normal.      Palpations: Abdomen is soft. Musculoskeletal:         General: No tenderness or deformity. Normal range of motion. Cervical back: Normal range of motion and neck supple. Skin:     General: Skin is warm and dry. Capillary Refill: Capillary refill takes less than 2 seconds. Findings: No bruising, lesion or rash. Neurological:      General: No focal deficit present. Mental Status: He is alert and oriented to person, place, and time. Mental status is at baseline. Psychiatric:         Mood and Affect: Mood normal.         Behavior: Behavior normal.         Thought Content: Thought content normal.         Judgment: Judgment normal.         Diagnostic Study Results     Labs -     Recent Results (from the past 12 hour(s))   CBC WITH AUTOMATED DIFF    Collection Time: 12/12/21  9:35 AM   Result Value Ref Range    WBC 9.4 4.1 - 11.1 K/uL    RBC 4.62 4.10 - 5.70 M/uL    HGB 15.0 12.1 - 17.0 g/dL    HCT 45.8 36.6 - 50.3 %    MCV 99.1 (H) 80.0 - 99.0 FL    MCH 32.5 26.0 - 34.0 PG    MCHC 32.8 30.0 - 36.5 g/dL    RDW 14.5 11.5 - 14.5 %    PLATELET 908 624 - 036 K/uL    MPV 9.8 8.9 - 12.9 FL    NRBC 0.0 0.0  WBC    ABSOLUTE NRBC 0.00 0.00 - 0.01 K/uL    NEUTROPHILS 77 (H) 32 - 75 %    LYMPHOCYTES 10 (L) 12 - 49 %    MONOCYTES 11 5 - 13 %    EOSINOPHILS 1 0 - 7 %    BASOPHILS 1 0 - 1 %    IMMATURE GRANULOCYTES 0 0 - 0.5 %    ABS.  NEUTROPHILS 7. 3 1.8 - 8.0 K/UL    ABS. LYMPHOCYTES 0.9 0.8 - 3.5 K/UL    ABS. MONOCYTES 1.0 0.0 - 1.0 K/UL    ABS. EOSINOPHILS 0.1 0.0 - 0.4 K/UL    ABS. BASOPHILS 0.1 0.0 - 0.1 K/UL    ABS. IMM. GRANS. 0.0 0.00 - 0.04 K/UL    DF AUTOMATED     METABOLIC PANEL, COMPREHENSIVE    Collection Time: 12/12/21  9:35 AM   Result Value Ref Range    Sodium 139 136 - 145 mmol/L    Potassium 4.2 3.5 - 5.1 mmol/L    Chloride 105 97 - 108 mmol/L    CO2 28 21 - 32 mmol/L    Anion gap 6 5 - 15 mmol/L    Glucose 119 (H) 65 - 100 mg/dL    BUN 21 (H) 6 - 20 mg/dL    Creatinine 1.09 0.70 - 1.30 mg/dL    BUN/Creatinine ratio 19 12 - 20      GFR est AA >60 >60 ml/min/1.73m2    GFR est non-AA >60 >60 ml/min/1.73m2    Calcium 8.9 8.5 - 10.1 mg/dL    Bilirubin, total 0.8 0.2 - 1.0 mg/dL    AST (SGOT) 128 (H) 15 - 37 U/L    ALT (SGPT) 69 12 - 78 U/L    Alk. phosphatase 272 (H) 45 - 117 U/L    Protein, total 6.5 6.4 - 8.2 g/dL    Albumin 3.3 (L) 3.5 - 5.0 g/dL    Globulin 3.2 2.0 - 4.0 g/dL    A-G Ratio 1.0 (L) 1.1 - 2.2     TROPONIN-HIGH SENSITIVITY    Collection Time: 12/12/21  9:35 AM   Result Value Ref Range    Troponin-High Sensitivity 8,908 (HH) 0 - 76 ng/L   NT-PRO BNP    Collection Time: 12/12/21 10:26 AM   Result Value Ref Range    NT pro-BNP 10,395 (H) <450 pg/mL         Radiologic Studies -   XR CHEST PORT   Final Result        CT Results  (Last 48 hours)    None        CXR Results  (Last 48 hours)               12/12/21 1040  XR CHEST PORT Final result    Narrative:  Chest single view. Patchy alveolar opacities mid and lower lung predominance. Findings concerning   for infectious/inflammatory process including viral etiologies. Sternal wires in   the midline related for intrathoracic surgery. Cardiac silhouette enlargement. Atherosclerotic change thoracic aorta. No pneumothorax or sizable pleural   effusion. Medical Decision Making and ED Course   I am the first provider for this patient.     I reviewed the vital signs, available nursing notes, past medical history, past surgical history, family history and social history. Vital Signs-Reviewed the patient's vital signs. Patient Vitals for the past 12 hrs:   Temp Pulse Resp BP SpO2   12/12/21 1259  90 25 (!) 119/98 98 %   12/12/21 1138  85 18 (!) 143/93 100 %   12/12/21 0903 98.5 °F (36.9 °C) 80 20 133/77 99 %       EKG interpretation:   EKG at 1044. Irregular irregular intervals. Atrial fibrillation versus flutter with 3-1 block. Reason rule out dysrhythmia. No ST changes. Interpreted by ER physician. Records Reviewed: Previous Hospital chart. EMS run report      ED Course:   Initial assessment performed. The patients presenting problems have been discussed, and they are in agreement with the care plan formulated and outlined with them. I have encouraged them to ask questions as they arise throughout their visit.     Orders Placed This Encounter    CULTURE, BLOOD #1     Standing Status:   Standing     Number of Occurrences:   1    CULTURE, BLOOD #2     Standing Status:   Standing     Number of Occurrences:   1    XR CHEST PORT     Standing Status:   Standing     Number of Occurrences:   1     Order Specific Question:   Reason for Exam     Answer:   sob    CBC WITH AUTOMATED DIFF     Standing Status:   Standing     Number of Occurrences:   1    METABOLIC PANEL, COMPREHENSIVE     Standing Status:   Standing     Number of Occurrences:   1    TROPONIN-HIGH SENSITIVITY     Standing Status:   Standing     Number of Occurrences:   1    PRO-BNP     Standing Status:   Standing     Number of Occurrences:   1    PHENYTOIN     Standing Status:   Standing     Number of Occurrences:   1    METABOLIC PANEL, COMPREHENSIVE     Standing Status:   Standing     Number of Occurrences:   1    CBC WITH AUTOMATED DIFF     Standing Status:   Standing     Number of Occurrences:   1    BLOOD GAS, ARTERIAL     Standing Status:   Standing     Number of Occurrences:   1    NT-PRO BNP Standing Status:   Standing     Number of Occurrences:   1    CBC W/O DIFF     Standing Status:   Standing     Number of Occurrences:   1    METABOLIC PANEL, COMPREHENSIVE     Standing Status:   Standing     Number of Occurrences:   1    TROPONIN-HIGH SENSITIVITY     Standing Status:   Standing     Number of Occurrences:   1    PTT     Standing Status:   Standing     Number of Occurrences:   1    PHENOBARBITAL LEVEL     Standing Status:   Standing     Number of Occurrences:   1    ADULT DIET Regular; Low Fat/Low Chol/High Fiber/2 gm Na     Standing Status:   Standing     Number of Occurrences:   1     Order Specific Question:   Primary Diet:     Answer:   Regular     Order Specific Question:   Cardiac Restriction:     Answer:   Low Fat/Low Chol/High Fiber/2 gm Na    CARDIAC MONITORING     Standing Status:   Standing     Number of Occurrences:   1     Order Specific Question:   Type: Answer:   Bedside     Order Specific Question:   Patient may go off unit without monitor     Answer:   No    VITAL SIGNS     Per unit routine     Standing Status:   Standing     Number of Occurrences:   1    ACTIVITY AS TOLERATED W/ASSIST     Standing Status:   Standing     Number of Occurrences:   1    INTAKE AND OUTPUT     Call for urine ouput less than 120ml in 4 hours     Standing Status:   Standing     Number of Occurrences:   1    NEURO/VASCULAR CHECKS     Standing Status:   Standing     Number of Occurrences:   1    ELEVATE EXTREMITY CONTINUOUS Routine     Standing Status:   Standing     Number of Occurrences:   1    ELEVATE HEAD OF BED     Elevate 30 Degrees.      Standing Status:   Standing     Number of Occurrences:   1    NURSING-MISCELLANEOUS: Palpate, scan or straight cath and document bladder residual as needed CONTINUOUS     Standing Status:   Standing     Number of Occurrences:   1     Order Specific Question:   Description of Order:     Answer:   Palpate, scan or straight cath and document bladder residual as needed    FULL CODE     Standing Status:   Standing     Number of Occurrences:   1    OXIMETRY, SPOT CHECK     Standing Status:   Standing     Number of Occurrences:   84366    OXYGEN CANNULA Liters per minute: 2; Indications for O2 therapy: HYPOXIA PRN Routine     Titrate rate up to 4 L / minute to maintain oxygen saturation at 90 % or greater. Standing Status:   Standing     Number of Occurrences:   20075     Order Specific Question:   Liters per minute: Answer:   2     Order Specific Question:   Indications for O2 therapy     Answer:   HYPOXIA    EKG, 12 LEAD, INITIAL     Standing Status:   Standing     Number of Occurrences:   1     Order Specific Question:   Reason for Exam:     Answer:   sob    aspirin tablet 325 mg    heparin (porcine) 1,000 unit/mL injection 4,000 Units    heparin (porcine) 25,000 units in 0.45% saline 250 ml infusion    DISCONTD: clopidogreL (PLAVIX) tablet 600 mg    DISCONTD: clopidogreL (PLAVIX) tablet 300 mg    clopidogreL (PLAVIX) tablet 300 mg    OR Linked Order Group     heparin (porcine) 1,000 unit/mL injection 4,000 Units     heparin (porcine) 1,000 unit/mL injection 2,000 Units    furosemide (LASIX) injection 40 mg    PHENobarbitaL (LUMINAL) tablet 97.2 mg    phenytoin ER (DILANTIN ER) ER capsule 100 mg    . PHARMACY TO SUBSTITUTE PER PROTOCOL (Reordered from: ALLOPURINOL PO)    aspirin tablet 325 mg     OP SIG:Take 325 mg by mouth daily.  metoprolol succinate (TOPROL-XL) XL tablet 25 mg    rosuvastatin (CRESTOR) tablet 40 mg     OP SIG:Take 40 mg by mouth nightly.         OR Linked Order Group     acetaminophen (TYLENOL) tablet 650 mg     acetaminophen (TYLENOL) suppository 650 mg    polyethylene glycol (MIRALAX) packet 17 g    OR Linked Order Group     ondansetron (ZOFRAN ODT) tablet 4 mg     ondansetron (ZOFRAN) injection 4 mg    furosemide (LASIX) injection 40 mg    IP CONSULT TO CARDIOLOGY     Standing Status:   Standing Number of Occurrences:   1     Order Specific Question:   Reason for Consult: Answer:   nstemi     Order Specific Question:   Did you call or speak to the consulting provider? Answer: Yes    INITIAL PHYSICIAN ORDER: INPATIENT Telemetry; Yes; 3. Patient receiving treatment that can only be provided in an inpatient setting (further clarification in H&P documentation)     Standing Status:   Standing     Number of Occurrences:   1     Order Specific Question:   Status: Answer:   INPATIENT [101]     Order Specific Question:   Type of Bed     Answer:   Telemetry [19]     Order Specific Question:   Cardiac Monitoring Required? Answer:   Yes     Order Specific Question:   Inpatient Hospitalization Certified Necessary for the Following Reasons     Answer:   3. Patient receiving treatment that can only be provided in an inpatient setting (further clarification in H&P documentation)     Order Specific Question:   Admitting Diagnosis     Answer:   NSTEMI (non-ST elevated myocardial infarction) [facility]) [[id number]]     Order Specific Question:   Admitting Physician     Answer:   Aby Child     Order Specific Question:   Attending Physician     Answer:   Aby Child     Order Specific Question:   Estimated Length of Stay     Answer:   3-4 Midnights     Order Specific Question:   Discharge Plan:     Answer:   Home with Office Follow-up    INITIAL PHYSICIAN ORDER: INPATIENT Telemetry; No; 3. Patient receiving treatment that can only be provided in an inpatient setting (further clarification in H&P documentation)     Standing Status:   Standing     Number of Occurrences:   1     Order Specific Question:   Status: Answer:   INPATIENT [101]     Order Specific Question:   Type of Bed     Answer:   Telemetry [19]     Order Specific Question:   Cardiac Monitoring Required?      Answer:   No     Order Specific Question:   Inpatient Hospitalization Certified Necessary for the Following Reasons     Answer:   3. Patient receiving treatment that can only be provided in an inpatient setting (further clarification in H&P documentation)     Order Specific Question:   Admitting Diagnosis     Answer:   NSTEMI (non-ST elevated myocardial infarction) [facility]) [[id number]]     Order Specific Question:   Admitting Physician     Answer:   Kei Sanchez     Order Specific Question:   Attending Physician     Answer:   Kei Sanchez     Order Specific Question:   Estimated Length of Stay     Answer:   2 Midnights     Order Specific Question:   Discharge Plan:     Answer:   Home with Office Follow-up                 Provider Notes (Medical Decision Making):   29-year-old male with a history of coronary disease presents with resolved chest pain some dyspnea on exertion and fatigue. Lab work x-ray EKG suggest an NSTEMI. Discussed the case with cardiology and admitting hospitalist.  Aspirin heparin Plavix. [address]    ED Course as of 12/12/21 1316   Sun Dec 12, 2021   1215 Pt and family request transfer to [facility] with his cardiologist    Through the [address] [place] [address] transfer center [facility] and other [facility] facilities able to handle cardiac are all on diversion. Patient and family updated of the situation. They are aware cardiology is coming to see them at bedside.  [HP]      ED Course User Index  [HP] Ney Coronel MD         Admitted    Procedures       CRITICAL CARE NOTE : heparin itititation  1:17 PM  Amount of Critical Care Time: 35 minutes    IMPENDING DETERIORATION -Airway, Respiratory and Cardiovascular  ASSOCIATED RISK FACTORS - Hypotension and Dysrhythmia  MANAGEMENT- Bedside Assessment and Supervision of Care  INTERPRETATION -  ECG and Blood Pressure  INTERVENTIONS - hemodynamic mngmt  CASE REVIEW - Hospitalist/Intensivist  TREATMENT RESPONSE -Improved  PERFORMED BY - Self    NOTES   :  I have spent critical care time involved in lab review, consultations with specialist, family decision- making, bedside attention and documentation. This time excludes time spent in any separate billed procedures. During this entire length of time I was immediately available to the patient . Ellen Melendez MD                    Disposition       Emergency Department Disposition:  Admitted      Diagnosis     Clinical Impression:   1. NSTEMI (non-ST elevated myocardial infarction) ([facility] [address]    2. Congestive heart failure, unspecified HF chronicity, unspecified heart failure type [facility])        Attestations:    Ellen Melendez MD    Please note that this dictation was completed with [employer], the computer voice recognition software. Quite often unanticipated grammatical, syntax, homophones, and other interpretive errors are inadvertently transcribed by the computer software. Please disregard these errors. Please excuse any errors that have escaped final proofreading. Thank you.

## 2021-12-12 NOTE — ED TRIAGE NOTES
Pt arrives via ems with c/o sob and fatigue that has gotten progressively worse over the past week. Denies CP.

## 2021-12-12 NOTE — Clinical Note
Status[de-identified] INPATIENT [101]   Type of Bed: Telemetry [19]   Cardiac Monitoring Required?: No   Inpatient Hospitalization Certified Necessary for the Following Reasons: 3.  Patient receiving treatment that can only be provided in an inpatient setting (further clarification in H&P documentation)   Admitting Diagnosis: NSTEMI (non-ST elevated myocardial infarction) [facility]) [[id number]]   Admitting Physician: Froilan Chairez [[id number]]   Attending Physician: Froilan Chairez [[id number]]   Estimated Length of Stay: 2 Midnights   Discharge Plan[de-identified] Home with Office Follow-up

## 2021-12-13 ENCOUNTER — APPOINTMENT (OUTPATIENT)
Dept: NON INVASIVE DIAGNOSTICS | Age: 86
DRG: 280 | End: 2021-12-13
Attending: STUDENT IN AN ORGANIZED HEALTH CARE EDUCATION/TRAINING PROGRAM
Payer: MEDICARE

## 2021-12-13 LAB
ALBUMIN SERPL-MCNC: 3.3 G/DL (ref 3.5–5)
ALBUMIN/GLOB SERPL: 0.9 {RATIO} (ref 1.1–2.2)
ALP SERPL-CCNC: 259 U/L (ref 45–117)
ALT SERPL-CCNC: 64 U/L (ref 12–78)
ANION GAP SERPL CALC-SCNC: 8 MMOL/L (ref 5–15)
APTT PPP: 78 SEC (ref 21.2–34.1)
APTT PPP: 81.8 SEC (ref 21.2–34.1)
AST SERPL W P-5'-P-CCNC: 102 U/L (ref 15–37)
ATRIAL RATE: 340 BPM
BASOPHILS # BLD: 0.1 K/UL (ref 0–0.1)
BASOPHILS NFR BLD: 1 % (ref 0–1)
BILIRUB SERPL-MCNC: 1.1 MG/DL (ref 0.2–1)
BNP SERPL-MCNC: ABNORMAL PG/ML
BUN SERPL-MCNC: 22 MG/DL (ref 6–20)
BUN/CREAT SERPL: 22 (ref 12–20)
CA-I BLD-MCNC: 9.1 MG/DL (ref 8.5–10.1)
CALCULATED R AXIS, ECG10: -52 DEGREES
CALCULATED T AXIS, ECG11: 146 DEGREES
CHLORIDE SERPL-SCNC: 103 MMOL/L (ref 97–108)
CO2 SERPL-SCNC: 30 MMOL/L (ref 21–32)
CREAT SERPL-MCNC: 1.02 MG/DL (ref 0.7–1.3)
DIAGNOSIS, 93000: NORMAL
DIFFERENTIAL METHOD BLD: ABNORMAL
ECHO AV MEAN GRADIENT: 10 MMHG
ECHO AV MEAN VELOCITY: 1.5 M/S
ECHO AV PEAK GRADIENT: 16 MMHG
ECHO AV PEAK VELOCITY: 2 M/S
ECHO AV VELOCITY RATIO: 0.2
ECHO AV VTI: 30.8 CM
ECHO LA AREA 2C: 24.3 CM2
ECHO LA AREA 4C: 32.5 CM2
ECHO LA MAJOR AXIS: 6.6 CM
ECHO LA MINOR AXIS: 6.4 CM
ECHO LA VOL BP: 99 ML (ref 18–58)
ECHO LA VOL/BSA BIPLANE: 52 ML/M2 (ref 16–28)
ECHO LV E' LATERAL VELOCITY: 9 CM/S
ECHO LV E' SEPTAL VELOCITY: 4 CM/S
ECHO LV EDV A2C: 128 ML
ECHO LV EDV A4C: 102 ML
ECHO LV EDV BP: 121 ML (ref 67–155)
ECHO LV EDV INDEX A4C: 54 ML/M2
ECHO LV EDV INDEX BP: 64 ML/M2
ECHO LV EDV NDEX A2C: 67 ML/M2
ECHO LV EJECTION FRACTION A2C: 25 %
ECHO LV EJECTION FRACTION A4C: 13 %
ECHO LV EJECTION FRACTION BIPLANE: 21 % (ref 55–100)
ECHO LV ESV A2C: 96 ML
ECHO LV ESV A4C: 89 ML
ECHO LV ESV BP: 95 ML (ref 22–58)
ECHO LV ESV INDEX A2C: 51 ML/M2
ECHO LV ESV INDEX A4C: 47 ML/M2
ECHO LV ESV INDEX BP: 50 ML/M2
ECHO LV FRACTIONAL SHORTENING: 14 % (ref 28–44)
ECHO LV INTERNAL DIMENSION DIASTOLE INDEX: 2.68 CM/M2
ECHO LV INTERNAL DIMENSION DIASTOLIC: 5.1 CM (ref 4.2–5.9)
ECHO LV INTERNAL DIMENSION SYSTOLIC INDEX: 2.32 CM/M2
ECHO LV INTERNAL DIMENSION SYSTOLIC: 4.4 CM
ECHO LV IVSD: 1.1 CM (ref 0.6–1)
ECHO LV MASS 2D: 213.9 G (ref 88–224)
ECHO LV MASS INDEX 2D: 112.6 G/M2 (ref 49–115)
ECHO LV POSTERIOR WALL DIASTOLIC: 1.1 CM (ref 0.6–1)
ECHO LV RELATIVE WALL THICKNESS RATIO: 0.43
ECHO LVOT AV VTI INDEX: 0.23
ECHO LVOT MEAN GRADIENT: 0 MMHG
ECHO LVOT PEAK GRADIENT: 1 MMHG
ECHO LVOT PEAK VELOCITY: 0.4 M/S
ECHO LVOT VTI: 7.1 CM
ECHO MV A VELOCITY: 0.33 M/S
ECHO MV E VELOCITY: 0.88 M/S
ECHO MV E/A RATIO: 2.67
ECHO MV E/E' LATERAL: 9.78
ECHO MV E/E' RATIO (AVERAGED): 15.89
ECHO MV E/E' SEPTAL: 22
ECHO MV EROA CONT EQ: 0.2 CM2
ECHO MV LVOT VTI INDEX: 2.15
ECHO MV MAX VELOCITY: 1 M/S
ECHO MV MEAN GRADIENT: 1 MMHG
ECHO MV MEAN VELOCITY: 0.4 M/S
ECHO MV PEAK GRADIENT: 4 MMHG
ECHO MV REGURGITANT ALIASING (NYQUIST) VELOCITY: 82 CM/S
ECHO MV REGURGITANT PEAK GRADIENT: 41 MMHG
ECHO MV REGURGITANT PEAK VELOCITY: 3.2 M/S
ECHO MV REGURGITANT VTIA: 104 CM
ECHO MV VTI: 15.3 CM
ECHO PV MAX VELOCITY: 0.8 M/S
ECHO PV MEAN GRADIENT: 2 MMHG
ECHO PV MEAN VELOCITY: 0.7 M/S
ECHO PV PEAK GRADIENT: 3 MMHG
ECHO PV VTI: 12.1 CM
ECHO RV BASAL DIMENSION: 40 CM
ECHO RV MID DIMENSION: 21 CM
ECHO TV REGURGITANT MAX VELOCITY: 4.21 M/S
ECHO TV REGURGITANT PEAK GRADIENT: 71 MMHG
EOSINOPHIL # BLD: 0.1 K/UL (ref 0–0.4)
EOSINOPHIL NFR BLD: 1 % (ref 0–7)
ERYTHROCYTE [DISTWIDTH] IN BLOOD BY AUTOMATED COUNT: 14.2 % (ref 11.5–14.5)
GLOBULIN SER CALC-MCNC: 3.8 G/DL (ref 2–4)
GLUCOSE SERPL-MCNC: 124 MG/DL (ref 65–100)
HCT VFR BLD AUTO: 47.4 % (ref 36.6–50.3)
HGB BLD-MCNC: 15.7 G/DL (ref 12.1–17)
IMM GRANULOCYTES # BLD AUTO: 0.1 K/UL (ref 0–0.04)
IMM GRANULOCYTES NFR BLD AUTO: 0 % (ref 0–0.5)
LYMPHOCYTES # BLD: 2.9 K/UL (ref 0.8–3.5)
LYMPHOCYTES NFR BLD: 23 % (ref 12–49)
MCH RBC QN AUTO: 32.6 PG (ref 26–34)
MCHC RBC AUTO-ENTMCNC: 33.1 G/DL (ref 30–36.5)
MCV RBC AUTO: 98.3 FL (ref 80–99)
MONOCYTES # BLD: 1.5 K/UL (ref 0–1)
MONOCYTES NFR BLD: 12 % (ref 5–13)
NEUTS SEG # BLD: 8 K/UL (ref 1.8–8)
NEUTS SEG NFR BLD: 63 % (ref 32–75)
NRBC # BLD: 0 K/UL (ref 0–0.01)
NRBC BLD-RTO: 0 PER 100 WBC
PHENOBARB SERPL-MCNC: 39.6 UG/ML (ref 15–40)
PLATELET # BLD AUTO: 253 K/UL (ref 150–400)
PMV BLD AUTO: 9.8 FL (ref 8.9–12.9)
POTASSIUM SERPL-SCNC: 3.7 MMOL/L (ref 3.5–5.1)
PROT SERPL-MCNC: 7.1 G/DL (ref 6.4–8.2)
Q-T INTERVAL, ECG07: 396 MS
QRS DURATION, ECG06: 120 MS
QTC CALCULATION (BEZET), ECG08: 471 MS
RBC # BLD AUTO: 4.82 M/UL (ref 4.1–5.7)
SODIUM SERPL-SCNC: 141 MMOL/L (ref 136–145)
THERAPEUTIC RANGE,PTTT: ABNORMAL SEC (ref 82–109)
THERAPEUTIC RANGE,PTTT: ABNORMAL SEC (ref 82–109)
VENTRICULAR RATE, ECG03: 85 BPM
WBC # BLD AUTO: 12.5 K/UL (ref 4.1–11.1)

## 2021-12-13 PROCEDURE — 94760 N-INVAS EAR/PLS OXIMETRY 1: CPT

## 2021-12-13 PROCEDURE — 65270000029 HC RM PRIVATE

## 2021-12-13 PROCEDURE — 85730 THROMBOPLASTIN TIME PARTIAL: CPT

## 2021-12-13 PROCEDURE — 77010033678 HC OXYGEN DAILY

## 2021-12-13 PROCEDURE — 74011250637 HC RX REV CODE- 250/637: Performed by: STUDENT IN AN ORGANIZED HEALTH CARE EDUCATION/TRAINING PROGRAM

## 2021-12-13 PROCEDURE — 74011250636 HC RX REV CODE- 250/636: Performed by: FAMILY MEDICINE

## 2021-12-13 PROCEDURE — 74011250637 HC RX REV CODE- 250/637: Performed by: FAMILY MEDICINE

## 2021-12-13 PROCEDURE — 36415 COLL VENOUS BLD VENIPUNCTURE: CPT

## 2021-12-13 PROCEDURE — 83880 ASSAY OF NATRIURETIC PEPTIDE: CPT

## 2021-12-13 PROCEDURE — 80053 COMPREHEN METABOLIC PANEL: CPT

## 2021-12-13 PROCEDURE — 74011250637 HC RX REV CODE- 250/637: Performed by: INTERNAL MEDICINE

## 2021-12-13 PROCEDURE — 93306 TTE W/DOPPLER COMPLETE: CPT

## 2021-12-13 PROCEDURE — 85025 COMPLETE CBC W/AUTO DIFF WBC: CPT

## 2021-12-13 RX ADMIN — ATORVASTATIN CALCIUM 80 MG: 40 TABLET, FILM COATED ORAL at 21:57

## 2021-12-13 RX ADMIN — FUROSEMIDE 40 MG: 10 INJECTION, SOLUTION INTRAMUSCULAR; INTRAVENOUS at 21:56

## 2021-12-13 RX ADMIN — HEPARIN SODIUM 2000 UNITS: 1000 INJECTION, SOLUTION INTRAVENOUS; SUBCUTANEOUS at 10:33

## 2021-12-13 RX ADMIN — PHENOBARBITAL 97.2 MG: 32.4 TABLET ORAL at 21:56

## 2021-12-13 RX ADMIN — HEPARIN SODIUM 16 UNITS/KG/HR: 10000 INJECTION, SOLUTION INTRAVENOUS at 10:15

## 2021-12-13 RX ADMIN — TICAGRELOR 180 MG: 90 TABLET ORAL at 17:58

## 2021-12-13 RX ADMIN — PHENYTOIN SODIUM 100 MG: 100 CAPSULE ORAL at 09:02

## 2021-12-13 RX ADMIN — ALLOPURINOL 300 MG: 300 TABLET ORAL at 21:57

## 2021-12-13 RX ADMIN — FUROSEMIDE 40 MG: 10 INJECTION, SOLUTION INTRAMUSCULAR; INTRAVENOUS at 09:02

## 2021-12-13 RX ADMIN — PHENOBARBITAL 97.2 MG: 32.4 TABLET ORAL at 09:02

## 2021-12-13 RX ADMIN — HEPARIN SODIUM 2000 UNITS: 1000 INJECTION, SOLUTION INTRAVENOUS; SUBCUTANEOUS at 22:14

## 2021-12-13 RX ADMIN — ASPIRIN 325 MG ORAL TABLET 325 MG: 325 PILL ORAL at 09:02

## 2021-12-13 RX ADMIN — CLOPIDOGREL BISULFATE 75 MG: 75 TABLET ORAL at 09:02

## 2021-12-13 RX ADMIN — PHENYTOIN SODIUM 100 MG: 100 CAPSULE ORAL at 21:57

## 2021-12-13 RX ADMIN — HEPARIN SODIUM 18 UNITS/KG/HR: 10000 INJECTION, SOLUTION INTRAVENOUS at 22:12

## 2021-12-13 NOTE — PROGRESS NOTES
Reason for Admission:  Chest pain, SOB                     RUR Score:          8%           Plan for utilizing home health:      No need for HH at this time. PCP: First and Last name:  Mason Price MD     Name of Practice:    Are you a current patient: Yes/No:  yes   Approximate date of last visit: 2 days before admission   Can you participate in a virtual visit with your PCP:                     Current Advanced Directive/Advance Care Plan: Full Code      Healthcare Decision Maker:   Click here to complete [address] including selection of the Healthcare Decision Maker Relationship (ie \"Primary\")             Primary Decision Maker: Yen Heredia - [phone]                  Transition of Care Plan:                      Patient lives at home with his wife in a one story home with 5 steps to entrance of home. Patient has never used HH, SNF or IRF services. Patient does not have DME. Patient currently drives himself to and from follow up appointments. Patient uses [facility] for maintenance prescriptions and [facility] [employer] in [place] for other short term prescriptions.

## 2021-12-13 NOTE — PROGRESS NOTES
Progress Note    Patient: Alejandra Keane MRN: [id number]  SSN: [id number]    YOB: 1933  Age: 80 y.o. Sex: male      Admit Date: 12/12/2021    LOS: 1 day     Subjective:     Patient seen and examined. Patient was followed for chest pain and NSTEMI. Patient has a history of CABG in 2007 (4 vessel). Patient states that he was short of breath overnight placed on O2 per protocol and his symptoms resided. He denies chest pain this morning. He is for a cardiac catheterization today. Written consent was obtained. Patient remains NPO. Telemetry Review: Atrial flutter, HR  bpm.     Review of Symptoms:   Review of Systems   Constitutional: Negative. Cardiovascular: Positive for dyspnea on exertion and irregular heartbeat. Negative for chest pain and palpitations. Respiratory: Positive for shortness of breath. Negative for cough and wheezing. Gastrointestinal: Negative for abdominal pain, constipation, nausea and vomiting. Neurological: Negative for light-headedness. Objective:     Vitals:    12/13/21 0000 12/13/21 0333 12/13/21 0400 12/13/21 0841   BP:  116/67  115/80   Pulse: 91 91 86 99   Resp:  20  18   Temp:  97.4 °F (36.3 °C)  99 °F (37.2 °C)   SpO2:  96%  99%   Weight:       Height:            Intake and Output:  Current Shift: No intake/output data recorded. Last three shifts: No intake/output data recorded. Physical Exam:   . Physical Exam  Constitutional:       General: He is not in acute distress. Appearance: Normal appearance. He is not ill-appearing. Cardiovascular:      Rate and Rhythm: Normal rate. Rhythm irregularly irregular. Pulses: Normal pulses. Heart sounds: Normal heart sounds. Pulmonary:      Effort: Pulmonary effort is normal.      Breath sounds: Normal breath sounds. No wheezing, rhonchi or rales. Abdominal:      General: Abdomen is flat. Bowel sounds are normal.      Palpations: Abdomen is soft.    Skin:     General: Skin is warm and dry.   Neurological:      General: No focal deficit present. Mental Status: He is alert and oriented to person, place, and time. Mental status is at baseline. Psychiatric:         Mood and Affect: Mood normal.         Behavior: Behavior normal.           Lab/Data Review: All lab results for the last 24 hours reviewed.          Current Facility-Administered Medications:     heparin (porcine) 25,000 units in 0.45% saline 250 ml infusion, 12-25 Units/kg/hr, IntraVENous, TITRATE, Kali Cruz MD, Last Rate: 12.2 mL/hr at 12/13/21 1015, 16 Units/kg/hr at 12/13/21 1015    heparin (porcine) 1,000 unit/mL injection 4,000 Units, 4,000 Units, IntraVENous, PRN **OR** heparin (porcine) 1,000 unit/mL injection 2,000 Units, 2,000 Units, IntraVENous, PRN, Kali Cruz MD, 2,000 Units at 12/13/21 1033    aspirin tablet 325 mg, 325 mg, Oral, DAILY, Kali Cruz MD, 325 mg at 12/13/21 0902    atorvastatin (LIPITOR) tablet 80 mg, 80 mg, Oral, QHS, Kali Cruz MD, 80 mg at 12/12/21 2010    acetaminophen (TYLENOL) tablet 650 mg, 650 mg, Oral, Q6H PRN, 650 mg at 12/12/21 2011 **OR** acetaminophen (TYLENOL) suppository 650 mg, 650 mg, Rectal, Q6H PRN, Saniya Cruz MD    polyethylene glycol (MIRALAX) packet 17 g, 17 g, Oral, DAILY PRN, Saniya Cruz MD    ondansetron (ZOFRAN ODT) tablet 4 mg, 4 mg, Oral, Q8H PRN **OR** ondansetron (ZOFRAN) injection 4 mg, 4 mg, IntraVENous, Q6H PRN, Kali Cruz MD    furosemide (LASIX) injection 40 mg, 40 mg, IntraVENous, BID, Kali Cruz MD, 40 mg at 12/13/21 0902    clopidogreL (PLAVIX) tablet 75 mg, 75 mg, Oral, DAILY, Reed Rao MD, 75 mg at 12/13/21 0902    metoprolol succinate (TOPROL-XL) XL tablet 50 mg, 50 mg, Oral, QHS, Kali Cruz MD, 50 mg at 12/12/21 2108    phenytoin ER (DILANTIN ER) ER capsule 100 mg, 100 mg, Oral, BID, Kali Cruz MD, 100 mg at 12/13/21 0902    PHENobarbitaL (LUMINAL) tablet 97.2 mg, 97.2 mg, Oral, BID, Kali Cruz MD, 97.2 mg at 12/13/21 0902    allopurinoL (ZYLOPRIM) tablet 300 mg, 300 mg, Oral, QHS, Kali Cruz MD, 300 mg at 12/12/21 2108      Assessment:     Active Problems:    NSTEMI (non-ST elevated myocardial infarction) [facility]) (12/12/2021)        Plan:     Case discussed with Collaborating physician Dr. Tori Ray and our recommendations are as follows:     1) NSTEMI:  Continue heparin gtt and ASA. I will add plavix 600mg then 75mg daily. C/w lipitor 80 and metoprolol 25 XL. Plan for cardiac cath tomorrow. Consent obtained.    2) Afib: Stop Eliquis. Last dose this morning. C/w heparin drip.    3) HFrEF:  Get Echocardiogram.  C/w lasix     Thank you for involving us in the care of this patient. Please do not hesitate to call if additional questions arise. If after hours please call [phone]. Signed By: Jemima Fortune NP     December 13, 2021        Dr. Arianna Pal Cardiologist    [facility]  [address] [facility] [address] [address] [facility].    [facility] Raoul Efrain Santos, [facility]  [phone]

## 2021-12-13 NOTE — PROGRESS NOTES
General Daily Progress Note          Patient Name:   Ching Anthony       YOB: 1933       Age:  80 y.o.       Admit Date: 12/12/2021      Subjective:       Patient is a 80y.o. year old male signal past medical history of CAD s/p CABG in 2007 hypertension gout seizure disorder came to emergency room complaining of chest pain shortness of breath for last 5 days denies any fever no chills     Seen by the ER physician work-up done in the ER shows elevated BNP elevated troponin     Patient was admitted with acute non-STEMI and CHF           Objective:     Visit Vitals  /80 (BP 1 Location: Right upper arm, BP Patient Position: At rest;Semi fowlers)   Pulse 99   Temp 99 °F (37.2 °C)   Resp 18   Ht 5' 8\" (1.727 m)   Wt 76.2 kg (168 lb)   SpO2 99%   BMI 25.54 kg/m²        Recent Results (from the past 24 hour(s))   PHENYTOIN    Collection Time: 12/12/21 12:30 PM   Result Value Ref Range    Phenytoin 13.1 10 - 20 ug/mL    Reported dose date 0      Reported dose: 0 Units   NT-PRO BNP    Collection Time: 12/12/21  1:08 PM   Result Value Ref Range    NT pro-BNP 10,729 (H) <450 pg/mL   PTT    Collection Time: 12/12/21  1:08 PM   Result Value Ref Range    aPTT 36.9 (H) 21.2 - 34.1 sec    aPTT, therapeutic range   82 - 109 sec   CULTURE, BLOOD    Collection Time: 12/12/21  1:42 PM    Specimen: Blood   Result Value Ref Range    Special Requests: Left  Antecubital        Culture result: No growth after 16 hours     CULTURE, BLOOD    Collection Time: 12/12/21  1:42 PM    Specimen: Blood   Result Value Ref Range    Special Requests: Left  Antecubital        Culture result: No growth after 16 hours     TROPONIN-HIGH SENSITIVITY    Collection Time: 12/12/21  1:42 PM   Result Value Ref Range    Troponin-High Sensitivity 8,731 (HH) 0 - 76 ng/L   METABOLIC PANEL, COMPREHENSIVE    Collection Time: 12/13/21  6:47 AM   Result Value Ref Range    Sodium 141 136 - 145 mmol/L    Potassium 3.7 3.5 - 5.1 mmol/L    Chloride 103 97 - 108 mmol/L    CO2 30 21 - 32 mmol/L    Anion gap 8 5 - 15 mmol/L    Glucose 124 (H) 65 - 100 mg/dL    BUN 22 (H) 6 - 20 mg/dL    Creatinine 1.02 0.70 - 1.30 mg/dL    BUN/Creatinine ratio 22 (H) 12 - 20      GFR est AA >60 >60 ml/min/1.73m2    GFR est non-AA >60 >60 ml/min/1.73m2    Calcium 9.1 8.5 - 10.1 mg/dL    Bilirubin, total 1.1 (H) 0.2 - 1.0 mg/dL    AST (SGOT) 102 (H) 15 - 37 U/L    ALT (SGPT) 64 12 - 78 U/L    Alk. phosphatase 259 (H) 45 - 117 U/L    Protein, total 7.1 6.4 - 8.2 g/dL    Albumin 3.3 (L) 3.5 - 5.0 g/dL    Globulin 3.8 2.0 - 4.0 g/dL    A-G Ratio 0.9 (L) 1.1 - 2.2     CBC WITH AUTOMATED DIFF    Collection Time: 12/13/21  6:47 AM   Result Value Ref Range    WBC 12.5 (H) 4.1 - 11.1 K/uL    RBC 4.82 4.10 - 5.70 M/uL    HGB 15.7 12.1 - 17.0 g/dL    HCT 47.4 36.6 - 50.3 %    MCV 98.3 80.0 - 99.0 FL    MCH 32.6 26.0 - 34.0 PG    MCHC 33.1 30.0 - 36.5 g/dL    RDW 14.2 11.5 - 14.5 %    PLATELET 464 199 - 914 K/uL    MPV 9.8 8.9 - 12.9 FL    NRBC 0.0 0.0  WBC    ABSOLUTE NRBC 0.00 0.00 - 0.01 K/uL    NEUTROPHILS 63 32 - 75 %    LYMPHOCYTES 23 12 - 49 %    MONOCYTES 12 5 - 13 %    EOSINOPHILS 1 0 - 7 %    BASOPHILS 1 0 - 1 %    IMMATURE GRANULOCYTES 0 0 - 0.5 %    ABS. NEUTROPHILS 8.0 1.8 - 8.0 K/UL    ABS. LYMPHOCYTES 2.9 0.8 - 3.5 K/UL    ABS. MONOCYTES 1.5 (H) 0.0 - 1.0 K/UL    ABS. EOSINOPHILS 0.1 0.0 - 0.4 K/UL    ABS. BASOPHILS 0.1 0.0 - 0.1 K/UL    ABS. IMM. GRANS. 0.1 (H) 0.00 - 0.04 K/UL    DF AUTOMATED     PTT    Collection Time: 12/13/21  6:47 AM   Result Value Ref Range    aPTT 78.0 (H) 21.2 - 34.1 sec    aPTT, therapeutic range   82 - 109 sec     [unfilled]      Review of Systems    Constitutional: Negative for chills and fever. HENT: Negative. Eyes: Negative. Respiratory: Negative. Cardiovascular: Negative. Gastrointestinal: Negative for abdominal pain and nausea. Skin: Negative. Neurological: Negative.         Physical Exam:      Constitutional: pt is oriented to person, place, and time. HENT:   Head: Normocephalic and atraumatic. Eyes: Pupils are equal, round, and reactive to light. EOM are normal.   Cardiovascular: Normal rate, regular rhythm and normal heart sounds. Pulmonary/Chest: Breath sounds normal. No wheezes. No rales. Exhibits no tenderness. Abdominal: Soft. Bowel sounds are normal. There is no abdominal tenderness. There is no rebound and no guarding. Musculoskeletal: Normal range of motion. Neurological: pt is alert and oriented to person, place, and time.      XR CHEST PORT   Final Result           Recent Results (from the past 24 hour(s))   PHENYTOIN    Collection Time: 12/12/21 12:30 PM   Result Value Ref Range    Phenytoin 13.1 10 - 20 ug/mL    Reported dose date 0      Reported dose: 0 Units   NT-PRO BNP    Collection Time: 12/12/21  1:08 PM   Result Value Ref Range    NT pro-BNP 10,729 (H) <450 pg/mL   PTT    Collection Time: 12/12/21  1:08 PM   Result Value Ref Range    aPTT 36.9 (H) 21.2 - 34.1 sec    aPTT, therapeutic range   82 - 109 sec   CULTURE, BLOOD    Collection Time: 12/12/21  1:42 PM    Specimen: Blood   Result Value Ref Range    Special Requests: Left  Antecubital        Culture result: No growth after 16 hours     CULTURE, BLOOD    Collection Time: 12/12/21  1:42 PM    Specimen: Blood   Result Value Ref Range    Special Requests: Left  Antecubital        Culture result: No growth after 16 hours     TROPONIN-HIGH SENSITIVITY    Collection Time: 12/12/21  1:42 PM   Result Value Ref Range    Troponin-High Sensitivity 8,731 (HH) 0 - 76 ng/L   METABOLIC PANEL, COMPREHENSIVE    Collection Time: 12/13/21  6:47 AM   Result Value Ref Range    Sodium 141 136 - 145 mmol/L    Potassium 3.7 3.5 - 5.1 mmol/L    Chloride 103 97 - 108 mmol/L    CO2 30 21 - 32 mmol/L    Anion gap 8 5 - 15 mmol/L    Glucose 124 (H) 65 - 100 mg/dL    BUN 22 (H) 6 - 20 mg/dL    Creatinine 1.02 0.70 - 1.30 mg/dL    BUN/Creatinine ratio 22 (H) 12 - 20 GFR est AA >60 >60 ml/min/1.73m2    GFR est non-AA >60 >60 ml/min/1.73m2    Calcium 9.1 8.5 - 10.1 mg/dL    Bilirubin, total 1.1 (H) 0.2 - 1.0 mg/dL    AST (SGOT) 102 (H) 15 - 37 U/L    ALT (SGPT) 64 12 - 78 U/L    Alk. phosphatase 259 (H) 45 - 117 U/L    Protein, total 7.1 6.4 - 8.2 g/dL    Albumin 3.3 (L) 3.5 - 5.0 g/dL    Globulin 3.8 2.0 - 4.0 g/dL    A-G Ratio 0.9 (L) 1.1 - 2.2     CBC WITH AUTOMATED DIFF    Collection Time: 12/13/21  6:47 AM   Result Value Ref Range    WBC 12.5 (H) 4.1 - 11.1 K/uL    RBC 4.82 4.10 - 5.70 M/uL    HGB 15.7 12.1 - 17.0 g/dL    HCT 47.4 36.6 - 50.3 %    MCV 98.3 80.0 - 99.0 FL    MCH 32.6 26.0 - 34.0 PG    MCHC 33.1 30.0 - 36.5 g/dL    RDW 14.2 11.5 - 14.5 %    PLATELET 103 772 - 412 K/uL    MPV 9.8 8.9 - 12.9 FL    NRBC 0.0 0.0  WBC    ABSOLUTE NRBC 0.00 0.00 - 0.01 K/uL    NEUTROPHILS 63 32 - 75 %    LYMPHOCYTES 23 12 - 49 %    MONOCYTES 12 5 - 13 %    EOSINOPHILS 1 0 - 7 %    BASOPHILS 1 0 - 1 %    IMMATURE GRANULOCYTES 0 0 - 0.5 %    ABS. NEUTROPHILS 8.0 1.8 - 8.0 K/UL    ABS. LYMPHOCYTES 2.9 0.8 - 3.5 K/UL    ABS. MONOCYTES 1.5 (H) 0.0 - 1.0 K/UL    ABS. EOSINOPHILS 0.1 0.0 - 0.4 K/UL    ABS. BASOPHILS 0.1 0.0 - 0.1 K/UL    ABS. IMM.  GRANS. 0.1 (H) 0.00 - 0.04 K/UL    DF AUTOMATED     PTT    Collection Time: 12/13/21  6:47 AM   Result Value Ref Range    aPTT 78.0 (H) 21.2 - 34.1 sec    aPTT, therapeutic range   82 - 109 sec       Results     Procedure Component Value Units Date/Time    CULTURE, BLOOD #1 [[id number]] Collected: 12/12/21 1342    Order Status: Completed Specimen: Blood Updated: 12/13/21 0756     Special Requests: --        Left  Antecubital       Culture result: No growth after 16 hours       CULTURE, BLOOD #2 [[id number]] Collected: 12/12/21 1342    Order Status: Completed Specimen: Blood Updated: 12/13/21 0756     Special Requests: --        Left  Antecubital       Culture result: No growth after 16 hours              Labs:     Recent Labs 12/13/21  0647 12/12/21  0935   WBC 12.5* 9.4   HGB 15.7 15.0   HCT 47.4 45.8    224     Recent Labs     12/13/21  0647 12/12/21  0935    139   K 3.7 4.2    105   CO2 30 28   BUN 22* 21*   CREA 1.02 1.09   * 119*   CA 9.1 8.9     Recent Labs     12/13/21  0647 12/12/21  0935   ALT 64 69   * 272*   TBILI 1.1* 0.8   TP 7.1 6.5   ALB 3.3* 3.3*   GLOB 3.8 3.2     Recent Labs     12/13/21  0647 12/12/21  1308   APTT 78.0* 36.9*      No results for input(s): FE, TIBC, PSAT, FERR in the last 72 hours. No results found for: FOL, RBCF   No results for input(s): PH, PCO2, PO2 in the last 72 hours. No results for input(s): CPK, CKNDX, TROIQ in the last 72 hours.     No lab exists for component: CPKMB  No results found for: CHOL, CHOLX, CHLST, CHOLV, HDL, HDLP, LDL, LDLC, DLDLP, TGLX, TRIGL, TRIGP, CHHD, CHHDX  No results found for: GLUCPOC  No results found for: COLOR, APPRN, SPGRU, REFSG, DELMI, PROTU, GLUCU, KETU, BILU, UROU, GUME, LEUKU, GLUKE, EPSU, BACTU, WBCU, RBCU, CASTS, UCRY      Assessment:     Non-STEMI  Acute congestive heart failure  CAD status post CABG  Hypertension  Seizure disorder      Plan:     Continue aspirin 325 mg daily Lipitor 80 mg daily Plavix 75 mg daily Lasix 40 IV twice daily metoprolol 50 mg daily phenobarbital twice a day phenytoin 100 mg twice a day on heparin drip  Cardiac cath tomorrow    Seen by the cardiology recommend continue current medication      Current Facility-Administered Medications:     heparin (porcine) 25,000 units in 0.45% saline 250 ml infusion, 12-25 Units/kg/hr, IntraVENous, TITRATE, Kali Cruz MD, Last Rate: 12.2 mL/hr at 12/13/21 1015, 16 Units/kg/hr at 12/13/21 1015    heparin (porcine) 1,000 unit/mL injection 4,000 Units, 4,000 Units, IntraVENous, PRN **OR** heparin (porcine) 1,000 unit/mL injection 2,000 Units, 2,000 Units, IntraVENous, PRN, Kali Cruz MD, 2,000 Units at 12/13/21 1033    aspirin tablet 325 mg, 325 mg, Oral, DAILY, Kali Cruz MD, 325 mg at 12/13/21 9437    atorvastatin (LIPITOR) tablet 80 mg, 80 mg, Oral, QHS, Kali Cruz MD, 80 mg at 12/12/21 2010    acetaminophen (TYLENOL) tablet 650 mg, 650 mg, Oral, Q6H PRN, 650 mg at 12/12/21 2011 **OR** acetaminophen (TYLENOL) suppository 650 mg, 650 mg, Rectal, Q6H PRN, Nicole Cruz MD    polyethylene glycol (MIRALAX) packet 17 g, 17 g, Oral, DAILY PRN, Nicole Cruz MD    ondansetron (ZOFRAN ODT) tablet 4 mg, 4 mg, Oral, Q8H PRN **OR** ondansetron (ZOFRAN) injection 4 mg, 4 mg, IntraVENous, Q6H PRN, Kali Cruz MD    furosemide (LASIX) injection 40 mg, 40 mg, IntraVENous, BID, Kali Cruz MD, 40 mg at 12/13/21 0902    clopidogreL (PLAVIX) tablet 75 mg, 75 mg, Oral, DAILY, Bridget Nava MD, 75 mg at 12/13/21 0902    metoprolol succinate (TOPROL-XL) XL tablet 50 mg, 50 mg, Oral, QHS, Kali Cruz MD, 50 mg at 12/12/21 2108    phenytoin ER (DILANTIN ER) ER capsule 100 mg, 100 mg, Oral, BID, Kali Cruz MD, 100 mg at 12/13/21 0902    PHENobarbitaL (LUMINAL) tablet 97.2 mg, 97.2 mg, Oral, BID, Kali Cruz MD, 97.2 mg at 12/13/21 0902    allopurinoL (ZYLOPRIM) tablet 300 mg, 300 mg, Oral, QHS, Kali Cruz MD, 300 mg at 12/12/21 2108

## 2021-12-13 NOTE — PROGRESS NOTES
Cardiology NP at bedside during assessment. Patient to have cardiac cath today. Patient verbalized understanding of procedure. Problem: Falls - Risk of  Goal: *Absence of Falls  Description: Document Brittany Ramirez Fall Risk and appropriate interventions in the flowsheet.   Outcome: Progressing Towards Goal  Note: Fall Risk Interventions:            Medication Interventions: Patient to call before getting OOB                   Problem: Patient Education: Go to Patient Education Activity  Goal: Patient/Family Education  Outcome: Progressing Towards Goal

## 2021-12-13 NOTE — CONSULTS
CARDIOLOGY CONSULTATION      REASON FOR CONSULT: Chest Pain, NSTEMI      REQUESTING PROVIDER: Anthony    CHIEF COMPLAINT:  Chest pain, SOB    HISTORY OF PRESENT ILLNESS:  Nelly Currie is a 80y.o. year-old male with past medical history significant for CABG in 2007 (4 Vessel) who was evaluated today due to chest pain. Patient has had chest pain for the last 3-4 days. He thought it was indigestion but it didn't go away. He was found to have elevated enzymes, and increased BNP. Records from hospital admission course thus far reviewed. Telemetry reviewed. INPATIENT MEDICATIONS:  Home medications reviewed. Current Facility-Administered Medications:     heparin (porcine) 25,000 units in 0.45% saline 250 ml infusion, 12-25 Units/kg/hr, IntraVENous, TITRATE, Kali Cruz MD, Last Rate: 9.1 mL/hr at 12/12/21 1451, 12 Units/kg/hr at 12/12/21 1451    heparin (porcine) 1,000 unit/mL injection 4,000 Units, 4,000 Units, IntraVENous, PRN **OR** heparin (porcine) 1,000 unit/mL injection 2,000 Units, 2,000 Units, IntraVENous, PRN, Nicole Cruz MD [facility]  . PHARMACY TO SUBSTITUTE PER PROTOCOL (Reordered from: ALLOPURINOL PO), , , Per Protocol, MD [facility]  [START ON 12/13/2021] aspirin tablet 325 mg, 325 mg, Oral, DAILY, Nicole Cruz MD [facility]  [START ON 12/13/2021] metoprolol succinate (TOPROL-XL) XL tablet 25 mg, 25 mg, Oral, DAILY, Nicole Cruz MD    atorvastatin (LIPITOR) tablet 80 mg, 80 mg, Oral, QHS, Kali Cruz MD    acetaminophen (TYLENOL) tablet 650 mg, 650 mg, Oral, Q6H PRN **OR** acetaminophen (TYLENOL) suppository 650 mg, 650 mg, Rectal, Q6H PRN, Nicole Cruz MD    polyethylene glycol (MIRALAX) packet 17 g, 17 g, Oral, DAILY PRN, Nicole Cruz MD    ondansetron (ZOFRAN ODT) tablet 4 mg, 4 mg, Oral, Q8H PRN **OR** ondansetron (ZOFRAN) injection 4 mg, 4 mg, IntraVENous, Q6H PRN, Kali Cruz MD    furosemide (LASIX) injection 40 mg, 40 mg, IntraVENous, BID, Mary Jo Cruz MD     ALLERGIES:  Allergies reviewed with the patient,  Allergies   Allergen Reactions    Codeine Swelling    . FAMILY HISTORY:  Family history reviewed. SOCIAL HISTORY:  Non smoker     REVIEW OF SYSTEMS:  Complete review of systems performed, pertinents noted above, all other systems are negative. PHYSICAL EXAMINATION:  Cardiovascular exam has a heart with a RRR normal S1 and S2. No murmur present. There are no rubs or gallops. Good peripheral pulses. No jugular venous distension. No carotid bruits are present. Respiratory exam reveals crackles in lung fields, no rales or rhonchi. Gastrointestinal exam has soft, nontender abdomen with normal bowel sounds. Lymphatic exam reveals mild edema and no varicosities. No notable skin changes. Neurologic exam is nonfocal.  Musculoskeletal exam is notable for a normal gait. Visit Vitals  BP (!) 133/91 (BP 1 Location: Left upper arm, BP Patient Position: At rest)   Pulse 94   Temp 98.5 °F (36.9 °C)   Resp 20   Ht 5' 8\" (1.727 m)   Wt 76.2 kg (168 lb)   SpO2 97%   BMI 25.54 kg/m²           Impression and recommendations are as follows:  1) NSTEMI:  Start heparin gtt. C/w ASA. I will add plavix 600mg then 75mg daily. C/w lipitor 80 and metoprolol 25 XL. Plan for cardiac cath tomorrow. 2) Afib: Stop Eliquis. Last dose this morning. C/w heparin drip. 3) HFrEF:  Get Echocardiogram.  C/w lasix         Thank you for involving us in the care of this patient.       Fred Zacarias MD  12/12/2021

## 2021-12-14 VITALS
HEART RATE: 111 BPM | HEIGHT: 68 IN | WEIGHT: 171.08 LBS | SYSTOLIC BLOOD PRESSURE: 116 MMHG | BODY MASS INDEX: 25.93 KG/M2 | OXYGEN SATURATION: 100 % | RESPIRATION RATE: 18 BRPM | TEMPERATURE: 97.4 F | DIASTOLIC BLOOD PRESSURE: 67 MMHG

## 2021-12-14 LAB
ALBUMIN SERPL-MCNC: 3.2 G/DL (ref 3.5–5)
ALBUMIN/GLOB SERPL: 0.8 {RATIO} (ref 1.1–2.2)
ALP SERPL-CCNC: 227 U/L (ref 45–117)
ALT SERPL-CCNC: 52 U/L (ref 12–78)
ANION GAP SERPL CALC-SCNC: 5 MMOL/L (ref 5–15)
APTT PPP: 121.6 SEC (ref 21.2–34.1)
AST SERPL W P-5'-P-CCNC: 79 U/L (ref 15–37)
BASOPHILS # BLD: 0.1 K/UL (ref 0–0.1)
BASOPHILS NFR BLD: 1 % (ref 0–1)
BILIRUB SERPL-MCNC: 0.8 MG/DL (ref 0.2–1)
BUN SERPL-MCNC: 24 MG/DL (ref 6–20)
BUN/CREAT SERPL: 21 (ref 12–20)
CA-I BLD-MCNC: 9 MG/DL (ref 8.5–10.1)
CHLORIDE SERPL-SCNC: 102 MMOL/L (ref 97–108)
CO2 SERPL-SCNC: 32 MMOL/L (ref 21–32)
CREAT SERPL-MCNC: 1.13 MG/DL (ref 0.7–1.3)
DIFFERENTIAL METHOD BLD: ABNORMAL
EOSINOPHIL # BLD: 0.1 K/UL (ref 0–0.4)
EOSINOPHIL NFR BLD: 1 % (ref 0–7)
ERYTHROCYTE [DISTWIDTH] IN BLOOD BY AUTOMATED COUNT: 14.1 % (ref 11.5–14.5)
GLOBULIN SER CALC-MCNC: 3.8 G/DL (ref 2–4)
GLUCOSE SERPL-MCNC: 112 MG/DL (ref 65–100)
HCT VFR BLD AUTO: 44 % (ref 36.6–50.3)
HGB BLD-MCNC: 14.8 G/DL (ref 12.1–17)
IMM GRANULOCYTES # BLD AUTO: 0 K/UL (ref 0–0.04)
IMM GRANULOCYTES NFR BLD AUTO: 0 % (ref 0–0.5)
LYMPHOCYTES # BLD: 1.8 K/UL (ref 0.8–3.5)
LYMPHOCYTES NFR BLD: 16 % (ref 12–49)
MCH RBC QN AUTO: 33 PG (ref 26–34)
MCHC RBC AUTO-ENTMCNC: 33.6 G/DL (ref 30–36.5)
MCV RBC AUTO: 98 FL (ref 80–99)
MONOCYTES # BLD: 1.4 K/UL (ref 0–1)
MONOCYTES NFR BLD: 12 % (ref 5–13)
NEUTS SEG # BLD: 7.7 K/UL (ref 1.8–8)
NEUTS SEG NFR BLD: 70 % (ref 32–75)
NRBC # BLD: 0 K/UL (ref 0–0.01)
NRBC BLD-RTO: 0 PER 100 WBC
PLATELET # BLD AUTO: 254 K/UL (ref 150–400)
PMV BLD AUTO: 9.9 FL (ref 8.9–12.9)
POTASSIUM SERPL-SCNC: 4 MMOL/L (ref 3.5–5.1)
PROT SERPL-MCNC: 7 G/DL (ref 6.4–8.2)
RBC # BLD AUTO: 4.49 M/UL (ref 4.1–5.7)
SODIUM SERPL-SCNC: 139 MMOL/L (ref 136–145)
THERAPEUTIC RANGE,PTTT: ABNORMAL SEC (ref 82–109)
WBC # BLD AUTO: 11.1 K/UL (ref 4.1–11.1)

## 2021-12-14 PROCEDURE — 80053 COMPREHEN METABOLIC PANEL: CPT

## 2021-12-14 PROCEDURE — 85025 COMPLETE CBC W/AUTO DIFF WBC: CPT

## 2021-12-14 PROCEDURE — 85730 THROMBOPLASTIN TIME PARTIAL: CPT

## 2021-12-14 PROCEDURE — 36415 COLL VENOUS BLD VENIPUNCTURE: CPT

## 2021-12-18 LAB
BACTERIA SPEC CULT: NORMAL
BACTERIA SPEC CULT: NORMAL
SPECIAL REQUESTS,SREQ: NORMAL
SPECIAL REQUESTS,SREQ: NORMAL

## 2021-12-26 NOTE — DISCHARGE SUMMARY
.     Discharge Summary       PATIENT ID: Luis Welsh  MRN: [id number]   YOB: 1933    DATE OF ADMISSION: 12/12/2021  9:03 AM    DATE OF DISCHARGE:   PRIMARY CARE PROVIDER: Ceferino Guzman MD     ATTENDING PHYSICIAN: Margarita Cruz  DISCHARGING PROVIDER: Margarita Cruz      CONSULTATIONS: IP CONSULT TO CARDIOLOGY    PROCEDURES/SURGERIES: Procedure(s):  LEFT HEART CATH / CORONARY ANGIOGRAPHY    ADMITTING DIAGNOSES:    Patient Active Problem List    Diagnosis Date Noted    NSTEMI (non-ST elevated myocardial infarction) ([facility] [address] 12/12/2021       DISCHARGE DIAGNOSES / PLAN:      Non-STEMI  Acute congestive heart failure  CAD status post CABG  Hypertension  Seizure disorder            DISCHARGE MEDICATIONS:  Discharge Medication List as of 12/14/2021  8:36 AM            NOTIFY YOUR PHYSICIAN FOR ANY OF THE FOLLOWING:   Fever over 101 degrees for 24 hours. Chest pain, shortness of breath, fever, chills, nausea, vomiting, diarrhea, change in mentation, falling, weakness, bleeding. Severe pain or pain not relieved by medications. Or, any other signs or symptoms that you may have questions about. DISPOSITION:  x  Home With:   OT  PT  HH  RN       Long term SNF/Inpatient Rehab    Independent/assisted living    Hospice    Other:       PATIENT CONDITION AT DISCHARGE: Stable      PHYSICAL EXAMINATION AT DISCHARGE:  General:          Alert, cooperative, no distress, appears stated age. HEENT:           Atraumatic, anicteric sclerae, pink conjunctivae                          No oral ulcers, mucosa moist, throat clear, dentition fair  Neck:               Supple, symmetrical  Lungs:             Clear to auscultation bilaterally. No Wheezing or Rhonchi. No rales. Chest wall:      No tenderness  No Accessory muscle use. Heart:              Regular  rhythm,  No  murmur   No edema  Abdomen:        Soft, non-tender. Not distended. Bowel sounds normal  Extremities:     No cyanosis.   No clubbing, Skin turgor normal, Capillary refill normal  Skin:                Not pale. Not Jaundiced  No rashes   Psych:             Not anxious or agitated. Neurologic:      Alert, moves all extremities, answers questions appropriately and responds to commands     XR CHEST PORT   Final Result           No results found for this or any previous visit (from the past 24 hour(s)).        HOSPITAL COURSE:    Patient is a 80y.o. year old male signal past medical history of CAD s/p CABG in 2007 hypertension gout seizure disorder came to emergency room complaining of chest pain shortness of breath for last 5 days denies any fever no chills     Seen by the ER physician work-up done in the ER shows elevated BNP elevated troponin     Patient was admitted with acute non-STEMI and CHF    Patient was seen by the cardiology recommend continue heparin drip aspirin added Plavix continue Lipitor and metoprolol and plan for the cardiac cath    Echo done shows ejection fraction of 5 to 10%    Follow-up with the cardiology recommendation likely transfer      Signed:   Brenda Ballesteros MD  12/26/2021  10:19 AM

## 2022-03-20 PROBLEM — I21.4 NSTEMI (NON-ST ELEVATED MYOCARDIAL INFARCTION) (HCC): Status: ACTIVE | Noted: 2021-12-12

## 2023-05-10 RX ORDER — MISOPROSTOL 100 UG/1
TABLET ORAL 2 TIMES DAILY
COMMUNITY
Start: 2012-08-06

## 2023-05-10 RX ORDER — ROSUVASTATIN CALCIUM 20 MG/1
40 TABLET, COATED ORAL NIGHTLY
COMMUNITY

## 2023-05-10 RX ORDER — ASPIRIN 325 MG
325 TABLET ORAL DAILY
COMMUNITY